# Patient Record
Sex: MALE | Race: WHITE | Employment: FULL TIME | URBAN - METROPOLITAN AREA
[De-identification: names, ages, dates, MRNs, and addresses within clinical notes are randomized per-mention and may not be internally consistent; named-entity substitution may affect disease eponyms.]

---

## 2017-05-04 ENCOUNTER — OFFICE VISIT (OUTPATIENT)
Dept: NEUROLOGY | Age: 42
End: 2017-05-04

## 2017-05-04 VITALS
SYSTOLIC BLOOD PRESSURE: 122 MMHG | HEART RATE: 75 BPM | WEIGHT: 265 LBS | BODY MASS INDEX: 37.1 KG/M2 | OXYGEN SATURATION: 98 % | DIASTOLIC BLOOD PRESSURE: 84 MMHG | HEIGHT: 71 IN

## 2017-05-04 DIAGNOSIS — G37.9 CLINICALLY ISOLATED SYNDROME (HCC): Primary | ICD-10-CM

## 2017-05-04 NOTE — MR AVS SNAPSHOT
Visit Information Date & Time Provider Department Dept. Phone Encounter #  
 5/4/2017  3:20 PM Mario Alberto Darby MD Neurology [facility] [place] [facility] [phone] [id number] Upcoming Health Maintenance Date Due INFLUENZA AGE 9 TO ADULT 8/1/2017 DTaP/Tdap/Td series (2 - Td) 12/16/2026 Allergies as of 5/4/2017  Review Complete On: 5/4/2017 By: Ronald Morfin LPN No Known Allergies Current Immunizations  Reviewed on 12/16/2016 Name Date Influenza Vaccine Guinevere Ashlyn) 10/21/2014 Influenza Vaccine (Quad) PF 11/22/2016 Tdap 12/16/2016 Not reviewed this visit You Were Diagnosed With   
  
 Codes Comments Clinically isolated syndrome (HCC)    -  Primary ICD-10-CM: G37.9 ICD-9-CM: 341. 9 Vitals BP Pulse Height(growth percentile) Weight(growth percentile) SpO2 BMI  
 122/84 (BP 1 Location: Left arm, BP Patient Position: Sitting) 75 5' 11\" (1.803 m) 265 lb (120.2 kg) 98% 36.96 kg/m2 Smoking Status Never Smoker BMI and BSA Data Body Mass Index Body Surface Area  
 36.96 kg/m 2 2.45 m 2 Preferred Pharmacy Pharmacy Name Phone [facility] [phone] [address] [phone] Your Updated Medication List  
  
   
This list is accurate as of: 5/4/17  3:47 PM.  Always use your most recent med list.  
  
  
  
  
 atorvastatin 40 mg tablet Commonly known as:  LIPITOR  
take 1 tablet by mouth once daily  
  
 ibuprofen 800 mg tablet Commonly known as:  MOTRIN Take 1 Tab by mouth every eight (8) hours as needed for Pain. interferon beta-1a 30 mcg/0.5 mL injection Commonly known as:  AVONEX Inject 30 mcg intramuscularly once a week as directed We Performed the Following CBC WITH AUTOMATED DIFF [98633 CPT(R)] METABOLIC PANEL, COMPREHENSIVE [13639 CPT(R)] To-Do List   
 05/05/2017 Imaging:  MRI BRAIN W WO CONT Introducing [facility]! Dear Natalie Savage: 
Thank you for requesting a [employer] account. Our records indicate that you already have an active [employer] account. You can access your account anytime at [email or web address] Did you know that you can access your hospital and ER discharge instructions at any time in [employer]? You can also review all of your test results from your hospital stay or ER visit. Additional Information If you have questions, please visit the Frequently Asked Questions section of the [employer] website at [email or web address] Remember, [employer] is NOT to be used for urgent needs. For medical emergencies, dial 911. Now available from your iPhone and Android! Please provide this summary of care documentation to your next provider. Your primary care clinician is listed as J Carlos Vale. If you have any questions after today's visit, please call [phone].

## 2017-05-04 NOTE — LETTER
Neurology Progress Note Patient ID: Jaspreet Frausto IV 
[id number] 
35 y.o. 
1975 HISTORY PROVIDED BY: 
Patient Chief Complaint: CIS Subjective:   
 
Mr. 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 He is on avonex. No side effects from this. He does have to stay well hydrated to help him. He takes it on Fridays. He takes tylenol before and after. He follows with ophthalmology yearly. He denies any focal numbness or weakness. He denies any further vision changes. He is doing well on Avonex. No major side effects. Recap: 
He has not had any new vision changes, as confirmed with his last opthomalogy appointment. He has no new numbness or weakness. His last MRI of cervical spine shows \"Normal cervical cord, Minimal multilevel degenerative disc disease,minimal degenerative disc bulging from C4 to C7. Foramina appear patent. \" MRI brain with stable white matter disease. He has been consistent with Avonex, and seems to be stable on this. He reports of dehydration with this medication, but supplements this increased water intake. He does not report any flu-like symptoms with this medication at this time. His last refill was in June, and therefore does not need another refill at this time. He does not take Copaxone as it was not complaint with his insurance. Otherwise, he is doing well with no other complaints at this time. Objective:  
ROS: 
Per HPI- 
Otherwise 12 point ROS was negative Meds: 
Current Outpatient Prescriptions on File Prior to Visit Medication Sig Dispense Refill  ibuprofen (MOTRIN) 800 mg tablet Take 1 Tab by mouth every eight (8) hours as needed for Pain.  30 Tab 0  
 atorvastatin (LIPITOR) 40 mg tablet take 1 tablet by mouth once daily 90 Tab 3  
 interferon beta-1a (AVONEX) 30 mcg/0.5 mL injection Inject 30 mcg intramuscularly once a week as directed 4 Kit 6  
 
 No current facility-administered medications on file prior to visit. Imaging: MRI brain in February 2016 was stable, with unremarkable findings. (I personally reviewed these images in PACS and this is my impression) MRI C spine: normal 
 
Reviewed records in [facility] and [facility] tab today- ophtho appt reviewed and pt had normal exam 
 
Lab Review Results for orders placed or performed in visit on 11/22/16 CBC WITH AUTOMATED DIFF Result Value Ref Range WBC 6.7 3.4 - 10.8 x10E3/uL  
 RBC 5.06 4.14 - 5.80 x10E6/uL HGB 15.7 12.6 - 17.7 g/dL HCT 45.7 37.5 - 51.0 % MCV 90 79 - 97 fL  
 MCH 31.0 26.6 - 33.0 pg  
 MCHC 34.4 31.5 - 35.7 g/dL  
 RDW 13.4 12.3 - 15.4 % PLATELET 683 004 - 585 x10E3/uL NEUTROPHILS 71 % Lymphocytes 21 % MONOCYTES 7 % EOSINOPHILS 1 % BASOPHILS 0 %  
 ABS. NEUTROPHILS 4.8 1.4 - 7.0 x10E3/uL Abs Lymphocytes 1.4 0.7 - 3.1 x10E3/uL  
 ABS. MONOCYTES 0.5 0.1 - 0.9 x10E3/uL  
 ABS. EOSINOPHILS 0.0 0.0 - 0.4 x10E3/uL  
 ABS. BASOPHILS 0.0 0.0 - 0.2 x10E3/uL IMMATURE GRANULOCYTES 0 %  
 ABS. IMM. GRANS. 0.0 0.0 - 0.1 x10E3/uL METABOLIC PANEL, COMPREHENSIVE Result Value Ref Range Glucose 76 65 - 99 mg/dL BUN 14 6 - 24 mg/dL Creatinine 0.78 0.76 - 1.27 mg/dL GFR est non- >59 mL/min/1.73 GFR est  >59 mL/min/1.73  
 BUN/Creatinine ratio 18 9 - 20 Sodium 142 136 - 144 mmol/L Potassium 4.5 3.5 - 5.2 mmol/L Chloride 99 97 - 106 mmol/L  
 CO2 27 18 - 29 mmol/L Calcium 9.3 8.7 - 10.2 mg/dL Protein, total 7.3 6.0 - 8.5 g/dL Albumin 4.5 3.5 - 5.5 g/dL GLOBULIN, TOTAL 2.8 1.5 - 4.5 g/dL A-G Ratio 1.6 1.1 - 2.5 Bilirubin, total 0.5 0.0 - 1.2 mg/dL Alk. phosphatase 67 39 - 117 IU/L  
 AST (SGOT) 31 0 - 40 IU/L  
 ALT (SGPT) 58 (H) 0 - 44 IU/L  
LIPID PANEL Result Value Ref Range Cholesterol, total 154 100 - 199 mg/dL Triglyceride 64 0 - 149 mg/dL HDL Cholesterol 50 >39 mg/dL VLDL, calculated 13 5 - 40 mg/dL LDL, calculated 91 0 - 99 mg/dL TSH 3RD GENERATION Result Value Ref Range TSH 1.660 0.450 - 4.500 uIU/mL CVD REPORT Result Value Ref Range INTERPRETATION Note Exam: 
Visit Vitals  /84 (BP 1 Location: Left arm, BP Patient Position: Sitting)  Pulse 75  Ht 5' 11\" (1.803 m)  Wt 120.2 kg (265 lb)  SpO2 98%  BMI 36.96 kg/m2 Gen: Well developed CV: RRR Lungs: non labored breathing Abd: non distending Neuro: A&O x 3, no dysarthria or aphasia CN II-XII: PERRL, EOMI, face symmetric, tongue/palate midline Motor: strength 5/5 all four ext Sensory: intact to LT Gait: normal 
 
Assessment:  
 
Kiran Mireles is  a 34 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 Currently he is on Avonex and doing well. He is staying hydrated and this helps him with symptoms of the medication. It is time to repeat imaging. Plan: 1. Continue on Avonex 2. Continue to pre-medicate side effects 3. Encouraged to stay physically active 4. Will do MRI of the brain today 5. Will do monitoring labs today 6. Cont with ophthalmology FU 6 months Felton Ramirez MD 
5/4/2017 
4:09 PM 
 
 
This note was created using voice recognition software. Despite editing, there may be syntax errors. This note will not be viewable in [address]

## 2017-05-04 NOTE — PROGRESS NOTES
Neurology Progress Note    Patient ID:  Nino Alonzo  [id number]  35 y.o.  1975    HISTORY PROVIDED BY:  Patient    Chief Complaint: CIS  Subjective:      Mr. 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 He is on avonex. No side effects from this. He does have to stay well hydrated to help him. He takes it on Fridays. He takes tylenol before and after. He follows with ophthalmology yearly. He denies any focal numbness or weakness. He denies any further vision changes. He is doing well on Avonex. No major side effects. Recap:  He has not had any new vision changes, as confirmed with his last opthomalogy appointment. He has no new numbness or weakness. His last MRI of cervical spine shows \"Normal cervical cord, Minimal multilevel degenerative disc disease,minimal degenerative disc bulging from C4 to C7. Foramina appear patent. \" MRI brain with stable white matter disease. He has been consistent with Avonex, and seems to be stable on this. He reports of dehydration with this medication, but supplements this increased water intake. He does not report any flu-like symptoms with this medication at this time. His last refill was in June, and therefore does not need another refill at this time. He does not take Copaxone as it was not complaint with his insurance. Otherwise, he is doing well with no other complaints at this time. Objective:   ROS:  Per HPI-  Otherwise 12 point ROS was negative    Meds:  Current Outpatient Prescriptions on File Prior to Visit   Medication Sig Dispense Refill    ibuprofen (MOTRIN) 800 mg tablet Take 1 Tab by mouth every eight (8) hours as needed for Pain.  30 Tab 0    atorvastatin (LIPITOR) 40 mg tablet take 1 tablet by mouth once daily 90 Tab 3    interferon beta-1a (AVONEX) 30 mcg/0.5 mL injection Inject 30 mcg intramuscularly once a week as directed 4 Kit 6     No current facility-administered medications on file prior to visit. Imaging:  MRI brain in February 2016 was stable, with unremarkable findings. (I personally reviewed these images in PACS and this is my impression)  MRI C spine: normal    Reviewed records in [facility] and media tab today- ophtho appt reviewed and pt had normal exam    Lab Review   Results for orders placed or performed in visit on 11/22/16   CBC WITH AUTOMATED DIFF   Result Value Ref Range    WBC 6.7 3.4 - 10.8 x10E3/uL    RBC 5.06 4.14 - 5.80 x10E6/uL    HGB 15.7 12.6 - 17.7 g/dL    HCT 45.7 37.5 - 51.0 %    MCV 90 79 - 97 fL    MCH 31.0 26.6 - 33.0 pg    MCHC 34.4 31.5 - 35.7 g/dL    RDW 13.4 12.3 - 15.4 %    PLATELET 019 894 - 881 x10E3/uL    NEUTROPHILS 71 %    Lymphocytes 21 %    MONOCYTES 7 %    EOSINOPHILS 1 %    BASOPHILS 0 %    ABS. NEUTROPHILS 4.8 1.4 - 7.0 x10E3/uL    Abs Lymphocytes 1.4 0.7 - 3.1 x10E3/uL    ABS. MONOCYTES 0.5 0.1 - 0.9 x10E3/uL    ABS. EOSINOPHILS 0.0 0.0 - 0.4 x10E3/uL    ABS. BASOPHILS 0.0 0.0 - 0.2 x10E3/uL    IMMATURE GRANULOCYTES 0 %    ABS. IMM. GRANS. 0.0 0.0 - 0.1 U77A2/CV   METABOLIC PANEL, COMPREHENSIVE   Result Value Ref Range    Glucose 76 65 - 99 mg/dL    BUN 14 6 - 24 mg/dL    Creatinine 0.78 0.76 - 1.27 mg/dL    GFR est non- >59 mL/min/1.73    GFR est  >59 mL/min/1.73    BUN/Creatinine ratio 18 9 - 20    Sodium 142 136 - 144 mmol/L    Potassium 4.5 3.5 - 5.2 mmol/L    Chloride 99 97 - 106 mmol/L    CO2 27 18 - 29 mmol/L    Calcium 9.3 8.7 - 10.2 mg/dL    Protein, total 7.3 6.0 - 8.5 g/dL    Albumin 4.5 3.5 - 5.5 g/dL    GLOBULIN, TOTAL 2.8 1.5 - 4.5 g/dL    A-G Ratio 1.6 1.1 - 2.5    Bilirubin, total 0.5 0.0 - 1.2 mg/dL    Alk.  phosphatase 67 39 - 117 IU/L    AST (SGOT) 31 0 - 40 IU/L    ALT (SGPT) 58 (H) 0 - 44 IU/L   LIPID PANEL   Result Value Ref Range    Cholesterol, total 154 100 - 199 mg/dL    Triglyceride 64 0 - 149 mg/dL    HDL Cholesterol 50 >39 mg/dL    VLDL, calculated 13 5 - 40 mg/dL    LDL, calculated 91 0 - 99 mg/dL   TSH 3RD GENERATION   Result Value Ref Range    TSH 1.660 0.450 - 4.500 uIU/mL   CVD REPORT   Result Value Ref Range    INTERPRETATION Note        Exam:  Visit Vitals    /84 (BP 1 Location: Left arm, BP Patient Position: Sitting)    Pulse 75    Ht 5' 11\" (1.803 m)    Wt 120.2 kg (265 lb)    SpO2 98%    BMI 36.96 kg/m2     Gen: Well developed  CV: RRR  Lungs: non labored breathing  Abd: non distending  Neuro: A&O x 3, no dysarthria or aphasia  CN II-XII: PERRL, EOMI, face symmetric, tongue/palate midline  Motor: strength 5/5 all four ext  Sensory: intact to LT  Gait: normal    Assessment:     Avinash Tineo is  a 36 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 Currently he is on Avonex and doing well. He is staying hydrated and this helps him with symptoms of the medication. It is time to repeat imaging. Plan:     1. Continue on Avonex  2. Continue to pre-medicate side effects   3. Encouraged to stay physically active   4. Will do MRI of the brain today  5. Will do monitoring labs today  6. Cont with ophthalmology      FU 6 months     Ralf Casrto MD  5/4/2017  4:09 PM      This note was created using voice recognition software. Despite editing, there may be syntax errors. This note will not be viewable in [address]

## 2017-05-16 ENCOUNTER — HOSPITAL ENCOUNTER (OUTPATIENT)
Dept: MRI IMAGING | Age: 42
Discharge: HOME | End: 2017-05-16
Attending: PSYCHIATRY & NEUROLOGY
Payer: COMMERCIAL

## 2017-05-16 DIAGNOSIS — G37.9 CLINICALLY ISOLATED SYNDROME (HCC): ICD-10-CM

## 2017-05-16 PROCEDURE — 70553 MRI BRAIN STEM W/O & W/DYE: CPT

## 2017-05-16 PROCEDURE — 74011250636 HC RX REV CODE- 250/636: Performed by: PSYCHIATRY & NEUROLOGY

## 2017-05-16 PROCEDURE — A9577 INJ MULTIHANCE: HCPCS | Performed by: PSYCHIATRY & NEUROLOGY

## 2017-05-16 RX ADMIN — GADOBENATE DIMEGLUMINE 20 ML: 529 INJECTION, SOLUTION INTRAVENOUS at 17:13

## 2017-11-20 RX ORDER — ATORVASTATIN CALCIUM 40 MG/1
TABLET, FILM COATED ORAL
Qty: 90 TAB | Refills: 2 | Status: SHIPPED | OUTPATIENT
Start: 2017-11-20 | End: 2018-12-07 | Stop reason: SDUPTHER

## 2017-11-21 ENCOUNTER — OFFICE VISIT (OUTPATIENT)
Dept: NEUROLOGY | Age: 42
End: 2017-11-21

## 2017-11-21 VITALS — HEART RATE: 86 BPM | DIASTOLIC BLOOD PRESSURE: 70 MMHG | SYSTOLIC BLOOD PRESSURE: 110 MMHG | OXYGEN SATURATION: 90 %

## 2017-11-21 DIAGNOSIS — H49.02 PARTIAL LEFT THIRD NERVE PALSY: ICD-10-CM

## 2017-11-21 DIAGNOSIS — G37.9 CLINICALLY ISOLATED SYNDROME (HCC): Primary | ICD-10-CM

## 2017-11-21 NOTE — PROGRESS NOTES
Neurology Progress Note    Patient ID:  Ian Garcia  [id number]  38 y.o.  1975    HISTORY PROVIDED BY:  Patient    Chief Complaint: CIS  Subjective:      Mr. 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 He is on avonex. Since last visit he did have updated imaging with MRI of the brain. It did show one new area of left frontal white matter change not previously seen. No enhancement. Patient denies any symptoms from this. Patient denies any focal numbness or weakness. He reports his vision is stable. He denies headaches. He reports he is doing well on his job with no complications. He takes Xanax on Fridays. His eyes this is well hydrated he has no issues with this. He did forget to get blood work at last visit we will get this today. Recap:  No side effects from this. He does have to stay well hydrated to help him. He takes it on Fridays. He takes tylenol before and after. He follows with ophthalmology yearly. He denies any focal numbness or weakness. He denies any further vision changes. He is doing well on Avonex. No major side effects. Objective:   ROS:  Per HPI-  Otherwise 12 point ROS was negative    Meds:  Current Outpatient Prescriptions on File Prior to Visit   Medication Sig Dispense Refill    atorvastatin (LIPITOR) 40 mg tablet take 1 tablet by mouth once daily 90 Tab 2    ibuprofen (MOTRIN) 800 mg tablet Take 1 Tab by mouth every eight (8) hours as needed for Pain. 30 Tab 0    interferon beta-1a (AVONEX) 30 mcg/0.5 mL injection Inject 30 mcg intramuscularly once a week as directed 4 Kit 6     No current facility-administered medications on file prior to visit.         Imaging:  MRI brain in May showed one new left frontal lesion with no enhancement otherwise unchanged (I personally reviewed these images in PACS and this is my impression)  MRI C spine: normal    Reviewed records in [facility] and [facility] tab today- ophtho appt reviewed and pt had normal exam    Lab Review   Results for orders placed or performed in visit on 11/22/16   CBC WITH AUTOMATED DIFF   Result Value Ref Range    WBC 6.7 3.4 - 10.8 x10E3/uL    RBC 5.06 4.14 - 5.80 x10E6/uL    HGB 15.7 12.6 - 17.7 g/dL    HCT 45.7 37.5 - 51.0 %    MCV 90 79 - 97 fL    MCH 31.0 26.6 - 33.0 pg    MCHC 34.4 31.5 - 35.7 g/dL    RDW 13.4 12.3 - 15.4 %    PLATELET 308 169 - 320 x10E3/uL    NEUTROPHILS 71 %    Lymphocytes 21 %    MONOCYTES 7 %    EOSINOPHILS 1 %    BASOPHILS 0 %    ABS. NEUTROPHILS 4.8 1.4 - 7.0 x10E3/uL    Abs Lymphocytes 1.4 0.7 - 3.1 x10E3/uL    ABS. MONOCYTES 0.5 0.1 - 0.9 x10E3/uL    ABS. EOSINOPHILS 0.0 0.0 - 0.4 x10E3/uL    ABS. BASOPHILS 0.0 0.0 - 0.2 x10E3/uL    IMMATURE GRANULOCYTES 0 %    ABS. IMM. GRANS. 0.0 0.0 - 0.1 [id number]/GAIL   METABOLIC PANEL, COMPREHENSIVE   Result Value Ref Range    Glucose 76 65 - 99 mg/dL    BUN 14 6 - 24 mg/dL    Creatinine 0.78 0.76 - 1.27 mg/dL    GFR est non- >59 mL/min/1.73    GFR est  >59 mL/min/1.73    BUN/Creatinine ratio 18 9 - 20    Sodium 142 136 - 144 mmol/L    Potassium 4.5 3.5 - 5.2 mmol/L    Chloride 99 97 - 106 mmol/L    CO2 27 18 - 29 mmol/L    Calcium 9.3 8.7 - 10.2 mg/dL    Protein, total 7.3 6.0 - 8.5 g/dL    Albumin 4.5 3.5 - 5.5 g/dL    GLOBULIN, TOTAL 2.8 1.5 - 4.5 g/dL    A-G Ratio 1.6 1.1 - 2.5    Bilirubin, total 0.5 0.0 - 1.2 mg/dL    Alk.  phosphatase 67 39 - 117 IU/L    AST (SGOT) 31 0 - 40 IU/L    ALT (SGPT) 58 (H) 0 - 44 IU/L   LIPID PANEL   Result Value Ref Range    Cholesterol, total 154 100 - 199 mg/dL    Triglyceride 64 0 - 149 mg/dL    HDL Cholesterol 50 >39 mg/dL    VLDL, calculated 13 5 - 40 mg/dL    LDL, calculated 91 0 - 99 mg/dL   TSH 3RD GENERATION   Result Value Ref Range    TSH 1.660 0.450 - 4.500 uIU/mL   CVD REPORT   Result Value Ref Range    INTERPRETATION Note        Exam:  Visit Vitals    /70    Pulse 86    SpO2 90% Gen: Well developed  CV: RRR  Lungs: non labored breathing  Abd: non distending  Neuro: A&O x 3, no dysarthria or aphasia  CN II-XII: PERRL, EOMI, face symmetric, tongue/palate midline  Motor: strength 5/5 all four ext  Sensory: intact to LT  Gait: normal    Assessment:     Leonid Adam is  a 37 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 Currently he is on Avonex and doing well. Will check monitoring labs today. We will repeat MRI prior to follow-up in May need to consider adjustment of medication of new lesions appear. Plan:     1. Continue on Avonex  2. Continue to pre-medicate side effects   3. Encouraged to stay physically active   4. Will do MRI of the brain 2 weeks prior to follow-up  5. Will do monitoring labs today  6. Cont with ophthalmology      FU 6 months     Payton Thompson MD  11/21/2017  4:09 PM      This note was created using voice recognition software. Despite editing, there may be syntax errors. This note will not be viewable in [address]

## 2017-11-21 NOTE — PATIENT INSTRUCTIONS
Learning About Living Martina  What is a living will? A living will is a legal form you use to write down the kind of care you want at the end of your life. It is used by the health professionals who will treat you if you aren't able to decide for yourself. If you put your wishes in writing, your loved ones and others will know what kind of care you want. They won't need to guess. This can ease your mind and be helpful to others. A living will is not the same as an estate or property will. An estate will explains what you want to happen with your money and property after you die. Is a living will a legal document? A living will is a legal document. Each state has its own laws about living [place]. If you move to another state, make sure that your living will is legal in the state where you now live. Or you might use a universal form that has been approved by many states. This kind of form can sometimes be completed and stored online. Your electronic copy will then be available wherever you have a connection to the Internet. In most cases, doctors will respect your wishes even if you have a form from a different state. · You don't need an  to complete a living will. But legal advice can be helpful if your state's laws are unclear, your health history is complicated, or your family can't agree on what should be in your living will. · You can change your living will at any time. Some people find that their wishes about end-of-life care change as their health changes. · In addition to making a living will, think about completing a medical power of  form. This form lets you name the person you want to make end-of-life treatment decisions for you (your \"health care agent\") if you're not able to. Many hospitals and nursing homes will give you the forms you need to complete a living will and a medical power of .   · Your living will is used only if you can't make or communicate decisions for yourself anymore. If you become able to make decisions again, you can accept or refuse any treatment, no matter what you wrote in your living will. · Your state may offer an online registry. This is a place where you can store your living will online so the doctors and nurses who need to treat you can find it right away. What should you think about when creating a living will? Talk about your end-of-life wishes with your family members and your doctor. Let them know what you want. That way the people making decisions for you won't be surprised by your choices. Think about these questions as you make your living will:  · Do you know enough about life support methods that might be used? If not, talk to your doctor so you know what might be done if you can't breathe on your own, your heart stops, or you're unable to swallow. · What things would you still want to be able to do after you receive life-support methods? Would you want to be able to walk? To speak? To eat on your own? To live without the help of machines? · If you have a choice, where do you want to be cared for? In your home? At a hospital or nursing home? · Do you want certain Pentecostalism practices performed if you become very ill? · If you have a choice at the end of your life, where would you prefer to die? At home? In a hospital or nursing home? Somewhere else? · Would you prefer to be buried or cremated? · Do you want your organs to be donated after you die? What should you do with your living will? · Make sure that your family members and your health care agent have copies of your living will. · Give your doctor a copy of your living will to keep in your medical record. If you have more than one doctor, make sure that each one has a copy. · You may want to put a copy of your living will where it can be easily found. Where can you learn more? Go to http://beverly-gume.info/.   Enter I095 in the search box to learn more about \"Learning About Living Martina. \"  Current as of: September 24, 2016  Content Version: 11.4  © 2754-3957 [employer]. Care instructions adapted under license by [employer] (which disclaims liability or warranty for this information). If you have questions about a medical condition or this instruction, always ask your healthcare professional. [address] any warranty or liability for your use of this information. Advance Directives: Care Instructions  Your Care Instructions  An advance directive is a legal way to state your wishes at the end of your life. It tells your family and your doctor what to do if you can no longer say what you want. There are two main types of advance directives. You can change them any time that your wishes change. · A living will tells your family and your doctor your wishes about life support and other treatment. · A durable power of  for health care lets you name a person to make treatment decisions for you when you can't speak for yourself. This person is called a health care agent. If you do not have an advance directive, decisions about your medical care may be made by a doctor or a  who doesn't know you. It may help to think of an advance directive as a gift to the people who care for you. If you have one, they won't have to make tough decisions by themselves. Follow-up care is a key part of your treatment and safety. Be sure to make and go to all appointments, and call your doctor if you are having problems. It's also a good idea to know your test results and keep a list of the medicines you take. How can you care for yourself at home? · Discuss your wishes with your loved ones and your doctor. This way, there are no surprises. · Many states have a unique form. Or you might use a universal form that has been approved by many states. This kind of form can sometimes be completed and stored online.  Your electronic copy will then be available wherever you have a connection to the Internet. In most cases, doctors will respect your wishes even if you have a form from a different state. · You don't need a  to do an advance directive. But you may want to get legal advice. · Think about these questions when you prepare an advance directive:  ¨ Who do you want to make decisions about your medical care if you are not able to? Many people choose a family member or close friend. ¨ Do you know enough about life support methods that might be used? If not, talk to your doctor so you understand. ¨ What are you most afraid of that might happen? You might be afraid of having pain, losing your independence, or being kept alive by machines. ¨ Where would you prefer to die? Choices include your home, a hospital, or a nursing home. ¨ Would you like to have information about hospice care to support you and your family? ¨ Do you want to donate organs when you die? ¨ Do you want certain Sikh practices performed before you die? If so, put your wishes in the advance directive. · Read your advance directive every year, and make changes as needed. When should you call for help? Be sure to contact your doctor if you have any questions. Where can you learn more? Go to http://beverly-gume.info/. Enter R264 in the search box to learn more about \"Advance Directives: Care Instructions. \"  Current as of: September 24, 2016  Content Version: 11.4  © 6106-1759 [employer]. Care instructions adapted under license by [employer] (which disclaims liability or warranty for this information). If you have questions about a medical condition or this instruction, always ask your healthcare professional. [place] [address] any warranty or liability for your use of this information.   10 Formerly named [facility]   Statement to Patients  April 1, 2014      In an effort to ensure the large volume of patient prescription refills is processed in the most efficient and expeditious manner, we are asking our patients to assist us by calling your Pharmacy for all prescription refills, this will include also your  Mail Order Pharmacy. The pharmacy will contact our office electronically to continue the refill process. Please do not wait until the last minute to call your pharmacy. We need at least 48 hours (2days) to fill prescriptions. We also encourage you to call your pharmacy before going to  your prescription to make sure it is ready. With regard to controlled substance prescription refill requests (narcotic refills) that need to be picked up at our office, we ask your cooperation by providing us with at least 72 hours (3days) notice that you will need a refill. We will not refill narcotic prescription refill requests after 4:00pm on any weekday, Monday through Thursday, or after 2:00pm on Fridays, or on the weekends. We encourage everyone to explore another way of getting your prescription refill request processed using [employer], our patient web portal through our electronic medical record system. [employer] is an efficient and effective way to communicate your medication request directly to the office and  downloadable as an meron on your smart phone . [employer] also features a review functionality that allows you to view your medication list as well as leave messages for your physician. Are you ready to get connected? If so please review the attatched instructions or speak to any of our staff to get you set up right away! Thank you so much for your cooperation. Should you have any questions please contact our Practice Administrator. The Physicians and Staff,  [facility]     If we have ordered testing for you, we typically do not call patients with results.  Your doctor or nurse will contact you if there are critical results that need to be addressed before your next appointment. We also schedule follow up appointments so that your results can be discussed in person and any questions you have regarding them may be addressed. Additionally, results may be found by using the My Chart feature and one of our patient service representatives at the  can give you instructions on how to access this feature of our electronic medical record system.

## 2017-11-21 NOTE — LETTER
Neurology Progress Note Patient ID: Leona April IV 
[id number] 
38 y.o. 
1975 HISTORY PROVIDED BY: 
Patient Chief Complaint: CIS Subjective:   
 
Mr. 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 He is on avonex. Since last visit he did have updated imaging with MRI of the brain. It did show one new area of left frontal white matter change not previously seen. No enhancement. Patient denies any symptoms from this. Patient denies any focal numbness or weakness. He reports his vision is stable. He denies headaches. He reports he is doing well on his job with no complications. He takes Xanax on Fridays. His eyes this is well hydrated he has no issues with this. He did forget to get blood work at last visit we will get this today. Recap: 
No side effects from this. He does have to stay well hydrated to help him. He takes it on Fridays. He takes tylenol before and after. He follows with ophthalmology yearly. He denies any focal numbness or weakness. He denies any further vision changes. He is doing well on Avonex. No major side effects. Objective:  
ROS: 
Per HPI- 
Otherwise 12 point ROS was negative Meds: 
Current Outpatient Prescriptions on File Prior to Visit Medication Sig Dispense Refill  atorvastatin (LIPITOR) 40 mg tablet take 1 tablet by mouth once daily 90 Tab 2  ibuprofen (MOTRIN) 800 mg tablet Take 1 Tab by mouth every eight (8) hours as needed for Pain. 30 Tab 0  
 interferon beta-1a (AVONEX) 30 mcg/0.5 mL injection Inject 30 mcg intramuscularly once a week as directed 4 Kit 6 No current facility-administered medications on file prior to visit. Imaging: MRI brain in May showed one new left frontal lesion with no enhancement otherwise unchanged (I personally reviewed these images in PACS and this is my impression) MRI C spine: normal 
 
 [facility] [employer] [facility] opho appt reviewed and pt had normal exam 
 
Lab Review Results for orders placed or performed in visit on 11/22/16 CBC WITH AUTOMATED DIFF Result Value Ref Range WBC 6.7 3.4 - 10.8 x10E3/uL  
 RBC 5.06 4.14 - 5.80 x10E6/uL HGB 15.7 12.6 - 17.7 g/dL HCT 45.7 37.5 - 51.0 % MCV 90 79 - 97 fL  
 MCH 31.0 26.6 - 33.0 pg  
 MCHC 34.4 31.5 - 35.7 g/dL  
 RDW 13.4 12.3 - 15.4 % PLATELET 481 744 - 298 x10E3/uL NEUTROPHILS 71 % Lymphocytes 21 % MONOCYTES 7 % EOSINOPHILS 1 % BASOPHILS 0 %  
 ABS. NEUTROPHILS 4.8 1.4 - 7.0 x10E3/uL Abs Lymphocytes 1.4 0.7 - 3.1 x10E3/uL  
 ABS. MONOCYTES 0.5 0.1 - 0.9 x10E3/uL  
 ABS. EOSINOPHILS 0.0 0.0 - 0.4 x10E3/uL  
 ABS. BASOPHILS 0.0 0.0 - 0.2 x10E3/uL IMMATURE GRANULOCYTES 0 %  
 ABS. IMM. GRANS. 0.0 0.0 - 0.1 x10E3/uL METABOLIC PANEL, COMPREHENSIVE Result Value Ref Range Glucose 76 65 - 99 mg/dL BUN 14 6 - 24 mg/dL Creatinine 0.78 0.76 - 1.27 mg/dL GFR est non- >59 mL/min/1.73 GFR est  >59 mL/min/1.73  
 BUN/Creatinine ratio 18 9 - 20 Sodium 142 136 - 144 mmol/L Potassium 4.5 3.5 - 5.2 mmol/L Chloride 99 97 - 106 mmol/L  
 CO2 27 18 - 29 mmol/L Calcium 9.3 8.7 - 10.2 mg/dL Protein, total 7.3 6.0 - 8.5 g/dL Albumin 4.5 3.5 - 5.5 g/dL GLOBULIN, TOTAL 2.8 1.5 - 4.5 g/dL A-G Ratio 1.6 1.1 - 2.5 Bilirubin, total 0.5 0.0 - 1.2 mg/dL Alk. phosphatase 67 39 - 117 IU/L  
 AST (SGOT) 31 0 - 40 IU/L  
 ALT (SGPT) 58 (H) 0 - 44 IU/L  
LIPID PANEL Result Value Ref Range Cholesterol, total 154 100 - 199 mg/dL Triglyceride 64 0 - 149 mg/dL HDL Cholesterol 50 >39 mg/dL VLDL, calculated 13 5 - 40 mg/dL LDL, calculated 91 0 - 99 mg/dL TSH 3RD GENERATION Result Value Ref Range TSH 1.660 0.450 - 4.500 uIU/mL CVD REPORT Result Value Ref Range INTERPRETATION Note Exam: 
Visit Vitals  /70  Pulse 86  
  SpO2 90% Gen: Well developed CV: RRR Lungs: non labored breathing Abd: non distending Neuro: A&O x 3, no dysarthria or aphasia CN II-XII: PERRL, EOMI, face symmetric, tongue/palate midline Motor: strength 5/5 all four ext Sensory: intact to LT Gait: normal 
 
Assessment:  
 
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 Currently he is on Avonex and doing well. Will check monitoring labs today. We will repeat MRI prior to follow-up in May need to consider adjustment of medication of new lesions appear. Plan: 1. Continue on Avonex 2. Continue to pre-medicate side effects 3. Encouraged to stay physically active 4. Will do MRI of the brain 2 weeks prior to follow-up 5. Will do monitoring labs today 6. Cont with ophthalmology FU 6 months Stevan Sawyer MD 
11/21/2017 
4:09 PM 
 
 
This note was created using voice recognition software. Despite editing, there may be syntax errors. This note will not be viewable in [address]

## 2017-11-21 NOTE — MR AVS SNAPSHOT
Visit Information Date & Time Provider Department Dept. Phone Encounter #  
 11/21/2017  1:40 PM [facility] [phone] [id number] Upcoming Health Maintenance Date Due Influenza Age 5 to Adult 8/1/2017 DTaP/Tdap/Td series (2 - Td) 12/16/2026 Allergies as of 11/21/2017  Review Complete On: 11/21/2017 By: Charles Greer No Known Allergies Current Immunizations  Reviewed on 12/16/2016 Name Date Influenza Vaccine Veronia Consuelo) 10/21/2014 Influenza Vaccine (Quad) PF 11/22/2016 Tdap 12/16/2016 Not reviewed this visit You Were Diagnosed With   
  
 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 
ICD-9-CM: 378.51 Clinically isolated syndrome (HCC)     ICD-10-CM: G37.9 ICD-9-CM: 341. 9 Vitals BP Pulse SpO2 Smoking Status 110/70 86 90% Never Smoker Preferred Pharmacy Pharmacy Name Phone Vicente Alonso, [address] [facility] [phone] Your Updated Medication List  
  
   
This list is accurate as of: 11/21/17  2:07 PM.  Always use your most recent med list.  
  
  
  
  
 atorvastatin 40 mg tablet Commonly known as:  LIPITOR  
take 1 tablet by mouth once daily  
  
 ibuprofen 800 mg tablet Commonly known as:  MOTRIN Take 1 Tab by mouth every eight (8) hours as needed for Pain. interferon beta-1a 30 mcg/0.5 mL injection Commonly known as:  AVONEX Inject 30 mcg intramuscularly once a week as directed We Performed the Following CBC WITH AUTOMATED DIFF [69645 CPT(R)] METABOLIC PANEL, COMPREHENSIVE [69827 CPT(R)] Patient Instructions Amanda Waters 1721 What is a living will? A living will is a legal form you use to write down the kind of care you want at the end of your life.  It is used by the health professionals who will treat you if you aren't able to decide for yourself. If you put your wishes in writing, your loved ones and others will know what kind of care you want. They won't need to guess. This can ease your mind and be helpful to others. A living will is not the same as an estate or property will. An estate will explains what you want to happen with your money and property after you die. Is a living will a legal document? A living will is a legal document. Each state has its own laws about living [place]. If you move to another state, make sure that your living will is legal in the state where you now live. Or you might use a universal form that has been approved by many states. This kind of form can sometimes be completed and stored online. Your electronic copy will then be available wherever you have a connection to the Internet. In most cases, doctors will respect your wishes even if you have a form from a different state. · You don't need an  to complete a living will. But legal advice can be helpful if your state's laws are unclear, your health history is complicated, or your family can't agree on what should be in your living will. · You can change your living will at any time. Some people find that their wishes about end-of-life care change as their health changes. · In addition to making a living will, think about completing a medical power of  form. This form lets you name the person you want to make end-of-life treatment decisions for you (your \"health care agent\") if you're not able to. Many hospitals and nursing homes will give you the forms you need to complete a living will and a medical power of . · Your living will is used only if you can't make or communicate decisions for yourself anymore. If you become able to make decisions again, you can accept or refuse any treatment, no matter what you wrote in your living will. · Your state may offer an online registry. This is a place where you can store your living will online so the doctors and nurses who need to treat you can find it right away. What should you think about when creating a living will? Talk about your end-of-life wishes with your family members and your doctor. Let them know what you want. That way the people making decisions for you won't be surprised by your choices. Think about these questions as you make your living will: · Do you know enough about life support methods that might be used? If not, talk to your doctor so you know what might be done if you can't breathe on your own, your heart stops, or you're unable to swallow. · What things would you still want to be able to do after you receive life-support methods? Would you want to be able to walk? To speak? To eat on your own? To live without the help of machines? · If you have a choice, where do you want to be cared for? In your home? At a hospital or nursing home? · Do you want certain Caodaism practices performed if you become very ill? · If you have a choice at the end of your life, where would you prefer to die? At home? In a hospital or nursing home? Somewhere else? · Would you prefer to be buried or cremated? · Do you want your organs to be donated after you die? What should you do with your living will? · Make sure that your family members and your health care agent have copies of your living will. · Give your doctor a copy of your living will to keep in your medical record. If you have more than one doctor, make sure that each one has a copy. · You may want to put a copy of your living will where it can be easily found. Where can you learn more? Go to http://beverly-gume.info/. Enter F254 in the search box to learn more about \"Learning About Living [place] Person. \" Current as of: September 24, 2016 Content Version: 11.4 © 8236-5934 Healthwise, [email or web address]. Care instructions adapted under license by [employer] (which disclaims liability or warranty for this information). If you have questions about a medical condition or this instruction, always ask your healthcare professional. [address] any warranty or liability for your use of this information. Advance Directives: Care Instructions Your Care Instructions An advance directive is a legal way to state your wishes at the end of your life. It tells your family and your doctor what to do if you can no longer say what you want. There are two main types of advance directives. You can change them any time that your wishes change. · A living will tells your family and your doctor your wishes about life support and other treatment. · A durable power of  for health care lets you name a person to make treatment decisions for you when you can't speak for yourself. This person is called a health care agent. If you do not have an advance directive, decisions about your medical care may be made by a doctor or a  who doesn't know you. It may help to think of an advance directive as a gift to the people who care for you. If you have one, they won't have to make tough decisions by themselves. Follow-up care is a key part of your treatment and safety. Be sure to make and go to all appointments, and call your doctor if you are having problems. It's also a good idea to know your test results and keep a list of the medicines you take. How can you care for yourself at home? · Discuss your wishes with your loved ones and your doctor. This way, there are no surprises. · Many states have a unique form. Or you might use a universal form that has been approved by many states. This kind of form can sometimes be completed and stored online.  Your electronic copy will then be available wherever you have a connection to the Internet. In most cases, doctors will respect your wishes even if you have a form from a different state. · You don't need a  to do an advance directive. But you may want to get legal advice. · Think about these questions when you prepare an advance directive: ¨ Who do you want to make decisions about your medical care if you are not able to? Many people choose a family member or close friend. ¨ Do you know enough about life support methods that might be used? If not, talk to your doctor so you understand. ¨ What are you most afraid of that might happen? You might be afraid of having pain, losing your independence, or being kept alive by machines. ¨ Where would you prefer to die? Choices include your home, a hospital, or a nursing home. ¨ Would you like to have information about hospice care to support you and your family? ¨ Do you want to donate organs when you die? ¨ Do you want certain Buddhism practices performed before you die? If so, put your wishes in the advance directive. · Read your advance directive every year, and make changes as needed. When should you call for help? Be sure to contact your doctor if you have any questions. Where can you learn more? Go to http://beverly-gume.info/. Enter R264 in the search box to learn more about \"Advance Directives: Care Instructions. \" Current as of: September 24, 2016 Content Version: 11.4 © 9171-6208 [employer]. Care instructions adapted under license by [employer] (which disclaims liability or warranty for this information). If you have questions about a medical condition or this instruction, always ask your healthcare professional. [place] [address] any warranty or liability for your use of this information. PRESCRIPTION REFILL POLICY [facility] Statement to Patients April 1, 2014 In an effort to ensure the large volume of patient prescription refills is processed in the most efficient and expeditious manner, we are asking our patients to assist us by calling your Pharmacy for all prescription refills, this will include also your  Mail Order Pharmacy. The pharmacy will contact our office electronically to continue the refill process. Please do not wait until the last minute to call your pharmacy. We need at least 48 hours (2days) to fill prescriptions. We also encourage you to call your pharmacy before going to  your prescription to make sure it is ready. With regard to controlled substance prescription refill requests (narcotic refills) that need to be picked up at our office, we ask your cooperation by providing us with at least 72 hours (3days) notice that you will need a refill. We will not refill narcotic prescription refill requests after 4:00pm on any weekday, Monday through Thursday, or after 2:00pm on Fridays, or on the weekends. We encourage everyone to explore another way of getting your prescription refill request processed using [employer], our patient web portal through our electronic medical record system. [employer] is an efficient and effective way to communicate your medication request directly to the office and  downloadable as an meron on your smart phone . [employer] also features a review functionality that allows you to view your medication list as well as leave messages for your physician. Are you ready to get connected? If so please review the attatched instructions or speak to any of our staff to get you set up right away! Thank you so much for your cooperation. Should you have any questions please contact our Practice Administrator. The Physicians and Staff,  [facility] If we have ordered testing for you, we typically do not call patients with results.  Your doctor or nurse will contact you if there are critical results that need to be addressed before your next appointment. We also schedule follow up appointments so that your results can be discussed in person and any questions you have regarding them may be addressed. Additionally, results may be found by using the My Chart feature and one of our patient service representatives at the  can give you instructions on how to access this feature of our electronic medical record system. Introducing [facility] & [facility] SERVICES! Dear Inge Gibbs: 
Thank you for requesting a [employer] account. Our records indicate that you already have an active [employer] account. You can access your account anytime at [email or web address]/[email or web address]. Did you know that you can access your hospital and ER discharge instructions at any time in [employer]? You can also review all of your test results from your hospital stay or ER visit. Additional Information If you have questions, please visit the Frequently Asked Questions section of the [employer] website at [email or web address]. Remember, [employer] is NOT to be used for urgent needs. For medical emergencies, dial 911. Now available from your iPhone and Android! Please provide this summary of care documentation to your next provider. Your primary care clinician is listed as Juarez Wei. If you have any questions after today's visit, please call [phone].

## 2017-11-22 LAB
ALBUMIN SERPL-MCNC: 4.1 G/DL (ref 3.5–5.5)
ALBUMIN/GLOB SERPL: 1.5 {RATIO} (ref 1.2–2.2)
ALP SERPL-CCNC: 67 IU/L (ref 39–117)
ALT SERPL-CCNC: 37 IU/L (ref 0–44)
AST SERPL-CCNC: 26 IU/L (ref 0–40)
BASOPHILS # BLD AUTO: 0 X10E3/UL (ref 0–0.2)
BASOPHILS NFR BLD AUTO: 0 %
BILIRUB SERPL-MCNC: 0.3 MG/DL (ref 0–1.2)
BUN SERPL-MCNC: 11 MG/DL (ref 6–24)
BUN/CREAT SERPL: 17 (ref 9–20)
CALCIUM SERPL-MCNC: 9.3 MG/DL (ref 8.7–10.2)
CHLORIDE SERPL-SCNC: 100 MMOL/L (ref 96–106)
CO2 SERPL-SCNC: 29 MMOL/L (ref 18–29)
CREAT SERPL-MCNC: 0.66 MG/DL (ref 0.76–1.27)
EOSINOPHIL # BLD AUTO: 0.1 X10E3/UL (ref 0–0.4)
EOSINOPHIL NFR BLD AUTO: 1 %
ERYTHROCYTE [DISTWIDTH] IN BLOOD BY AUTOMATED COUNT: 13.1 % (ref 12.3–15.4)
GFR SERPLBLD CREATININE-BSD FMLA CKD-EPI: 118 ML/MIN/1.73
GFR SERPLBLD CREATININE-BSD FMLA CKD-EPI: 136 ML/MIN/1.73
GLOBULIN SER CALC-MCNC: 2.7 G/DL (ref 1.5–4.5)
GLUCOSE SERPL-MCNC: 80 MG/DL (ref 65–99)
HCT VFR BLD AUTO: 43 % (ref 37.5–51)
HGB BLD-MCNC: 14.9 G/DL (ref 12.6–17.7)
IMM GRANULOCYTES # BLD: 0 X10E3/UL (ref 0–0.1)
IMM GRANULOCYTES NFR BLD: 1 %
LYMPHOCYTES # BLD AUTO: 1.4 X10E3/UL (ref 0.7–3.1)
LYMPHOCYTES NFR BLD AUTO: 22 %
MCH RBC QN AUTO: 30.5 PG (ref 26.6–33)
MCHC RBC AUTO-ENTMCNC: 34.7 G/DL (ref 31.5–35.7)
MCV RBC AUTO: 88 FL (ref 79–97)
MONOCYTES # BLD AUTO: 0.5 X10E3/UL (ref 0.1–0.9)
MONOCYTES NFR BLD AUTO: 7 %
NEUTROPHILS # BLD AUTO: 4.5 X10E3/UL (ref 1.4–7)
NEUTROPHILS NFR BLD AUTO: 69 %
PLATELET # BLD AUTO: 196 X10E3/UL (ref 150–379)
POTASSIUM SERPL-SCNC: 4.4 MMOL/L (ref 3.5–5.2)
PROT SERPL-MCNC: 6.8 G/DL (ref 6–8.5)
RBC # BLD AUTO: 4.88 X10E6/UL (ref 4.14–5.8)
SODIUM SERPL-SCNC: 143 MMOL/L (ref 134–144)
WBC # BLD AUTO: 6.5 X10E3/UL (ref 3.4–10.8)

## 2018-04-20 ENCOUNTER — TELEPHONE (OUTPATIENT)
Dept: NEUROLOGY | Age: 43
End: 2018-04-20

## 2018-04-20 NOTE — TELEPHONE ENCOUNTER
----- Message from Namita Cardona sent at 4/20/2018  9:17 AM EDT -----  Regarding: Dr. Arlet Jolly  Pt stated he would like a call from the nurse regarding his medication(\"Avonex\"). Best contact number [phone].

## 2018-04-20 NOTE — TELEPHONE ENCOUNTER
Spoke with patient regarding his avonex. He stated that he spoke with [employer] just and that everything was resolved and he will be getting his injection pen tomorrow.

## 2018-04-20 NOTE — TELEPHONE ENCOUNTER
----- Message from Becki Benz sent at 4/20/2018  3:59 PM EDT -----  Regarding: Dr Lionel Beltran  Pt stated he would like a call from the nurse regarding his medication(\"Avonex\").   Best contact number [phone].

## 2018-05-22 ENCOUNTER — OFFICE VISIT (OUTPATIENT)
Dept: NEUROLOGY | Age: 43
End: 2018-05-22

## 2018-05-22 VITALS
HEIGHT: 71 IN | WEIGHT: 265 LBS | HEART RATE: 69 BPM | RESPIRATION RATE: 18 BRPM | DIASTOLIC BLOOD PRESSURE: 72 MMHG | SYSTOLIC BLOOD PRESSURE: 124 MMHG | OXYGEN SATURATION: 97 % | BODY MASS INDEX: 37.1 KG/M2

## 2018-05-22 DIAGNOSIS — G37.9 CLINICALLY ISOLATED SYNDROME (HCC): Primary | ICD-10-CM

## 2018-05-22 NOTE — PROGRESS NOTES
Neurology Progress Note    Patient ID:  Gaby Flores  [id number]  38 y.o.  1975    HISTORY PROVIDED BY:  Patient    Chief Complaint: CIS  Subjective:      Mr. 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 He was diagnoesd with CIS. He is doing well on the Avonex. He takes it every Friday. If he doesn't hydrate he will feel kind of off. He is working with no issues. He denies any flares. He did have an MRI of the brain that showed a new lesion last May, but he did not have any symptoms from this, and did not want to change therapy at that time. He is on the free drug program with Avonex so would prefer to be on it possible. He denies any fatigue. He denies any memory changes. Overall he reports feeling very well. Recap:  He is on avonex. Since last visit he did have updated imaging with MRI of the brain. It did show one new area of left frontal white matter change not previously seen. No enhancement. Patient denies any symptoms from this. Patient denies any focal numbness or weakness. He reports his vision is stable. He denies headaches. He reports he is doing well on his job with no complications. He takes Xanax on Fridays. His eyes this is well hydrated he has no issues with this. He did forget to get blood work at last visit we will get this today. Objective:   ROS:  Per HPI-  Otherwise 12 point ROS was negative    Meds:  Current Outpatient Prescriptions on File Prior to Visit   Medication Sig Dispense Refill    atorvastatin (LIPITOR) 40 mg tablet take 1 tablet by mouth once daily 90 Tab 2    ibuprofen (MOTRIN) 800 mg tablet Take 1 Tab by mouth every eight (8) hours as needed for Pain. 30 Tab 0    interferon beta-1a (AVONEX) 30 mcg/0.5 mL injection Inject 30 mcg intramuscularly once a week as directed 4 Kit 6     No current facility-administered medications on file prior to visit.         Imaging:  MRI brain in May showed one new left frontal lesion with no enhancement otherwise unchanged (I personally reviewed these images in PACS and this is my impression)  MRI C spine: normal    Reviewed records in [employer] and media tab today- ophtho appt reviewed and pt had normal exam    Lab Review   Results for orders placed or performed in visit on 11/21/17   CBC WITH AUTOMATED DIFF   Result Value Ref Range    WBC 6.5 3.4 - 10.8 x10E3/uL    RBC 4.88 4.14 - 5.80 x10E6/uL    HGB 14.9 12.6 - 17.7 g/dL    HCT 43.0 37.5 - 51.0 %    MCV 88 79 - 97 fL    MCH 30.5 26.6 - 33.0 pg    MCHC 34.7 31.5 - 35.7 g/dL    RDW 13.1 12.3 - 15.4 %    PLATELET 905 069 - 989 x10E3/uL    NEUTROPHILS 69 Not Estab. %    Lymphocytes 22 Not Estab. %    MONOCYTES 7 Not Estab. %    EOSINOPHILS 1 Not Estab. %    BASOPHILS 0 Not Estab. %    ABS. NEUTROPHILS 4.5 1.4 - 7.0 x10E3/uL    Abs Lymphocytes 1.4 0.7 - 3.1 x10E3/uL    ABS. MONOCYTES 0.5 0.1 - 0.9 x10E3/uL    ABS. EOSINOPHILS 0.1 0.0 - 0.4 x10E3/uL    ABS. BASOPHILS 0.0 0.0 - 0.2 x10E3/uL    IMMATURE GRANULOCYTES 1 Not Estab. %    ABS. IMM. GRANS. 0.0 0.0 - 0.1 [id number]/XO   METABOLIC PANEL, COMPREHENSIVE   Result Value Ref Range    Glucose 80 65 - 99 mg/dL    BUN 11 6 - 24 mg/dL    Creatinine 0.66 (L) 0.76 - 1.27 mg/dL    GFR est non- >59 mL/min/1.73    GFR est  >59 mL/min/1.73    BUN/Creatinine ratio 17 9 - 20    Sodium 143 134 - 144 mmol/L    Potassium 4.4 3.5 - 5.2 mmol/L    Chloride 100 96 - 106 mmol/L    CO2 29 18 - 29 mmol/L    Calcium 9.3 8.7 - 10.2 mg/dL    Protein, total 6.8 6.0 - 8.5 g/dL    Albumin 4.1 3.5 - 5.5 g/dL    GLOBULIN, TOTAL 2.7 1.5 - 4.5 g/dL    A-G Ratio 1.5 1.2 - 2.2    Bilirubin, total 0.3 0.0 - 1.2 mg/dL    Alk. phosphatase 67 39 - 117 IU/L    AST (SGOT) 26 0 - 40 IU/L    ALT (SGPT) 37 0 - 44 IU/L       Exam:  Visit Vitals    /72    Pulse 69    Resp 18    Ht 5' 11\" (1.803 m)    Wt 120.2 kg (265 lb)    SpO2 97%    BMI 36.96 kg/m2     Gen:  Well developed  CV: RRR  Lungs: non labored breathing  Abd: non distending  Neuro: A&O x 3, no dysarthria or aphasia  CN II-XII: PERRL, EOMI, face symmetric, tongue/palate midline  Motor: strength 5/5 all four ext  Sensory: intact to LT  Gait: normal    Assessment:     Kate Campos is  a 37 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 Currently he is on Avonex and doing well. Monitoring labs were stable. Will recheck MRI of the brain and it has additional lesions, will discuss changing therapy. Plan:     1. Continue on Avonex. Patient is on a free drug program  2. Continue to pre-medicate side effects   3. Encouraged to stay physically active   4. Plan for MRI of the brain with and without contrast  5. Monitoring labs stable  6. Cont with ophthalmology      FU 6 months     Keyona Starks MD  5/22/2018  4:09 PM      This note was created using voice recognition software. Despite editing, there may be syntax errors. This note will not be viewable in [address]

## 2018-05-22 NOTE — MR AVS SNAPSHOT
[address] [place] [address] [address] [facility] [place] [address] [phone] Patient: Loni Hu IV MRN: [id number] TALIA:[id number]/[id number] Visit Information Date & Time Provider Department Dept. Phone Encounter #  
 5/22/2018  1:40 PM Elvira Segal MD [facility] Neurology [facility] [phone] [id number] Follow-up Instructions Return in about 6 months (around 11/22/2018). Upcoming Health Maintenance Date Due Influenza Age 5 to Adult 8/1/2018 DTaP/Tdap/Td series (2 - Td) 12/16/2026 Allergies as of 5/22/2018  Review Complete On: 11/21/2017 By: Elvira Segal MD  
 No Known Allergies Current Immunizations  Reviewed on 12/16/2016 Name Date Influenza Vaccine Caesar Jeseniay) 10/21/2014 Influenza Vaccine (Quad) PF 11/22/2016 Tdap 12/16/2016 Not reviewed this visit You Were Diagnosed With   
  
 Codes Comments Clinically isolated syndrome (HCC)    -  Primary ICD-10-CM: G37.9 ICD-9-CM: 341. 9 Vitals BP Pulse Resp Height(growth percentile) Weight(growth percentile) SpO2  
 124/72 69 18 5' 11\" (1.803 m) 265 lb (120.2 kg) 97% BMI Smoking Status 36.96 kg/m2 Never Smoker BMI and BSA Data Body Mass Index Body Surface Area  
 36.96 kg/m 2 2.45 m 2 Preferred Pharmacy Pharmacy Name Phone [facility], [address] [facility] [phone] Your Updated Medication List  
  
   
This list is accurate as of 5/22/18  1:56 PM.  Always use your most recent med list.  
  
  
  
  
 atorvastatin 40 mg tablet Commonly known as:  LIPITOR  
take 1 tablet by mouth once daily  
  
 ibuprofen 800 mg tablet Commonly known as:  MOTRIN Take 1 Tab by mouth every eight (8) hours as needed for Pain. interferon beta-1a 30 mcg/0.5 mL injection Commonly known as:  AVONEX Inject 30 mcg intramuscularly once a week as directed Follow-up Instructions Return in about 6 months (around 11/22/2018). To-Do List   
 05/23/2018 Imaging:  MRI BRAIN W WO CONT Patient Instructions Amanda Waters 1721 What is a living will? A living will is a legal form you use to write down the kind of care you want at the end of your life. It is used by the health professionals who will treat you if you aren't able to decide for yourself. If you put your wishes in writing, your loved ones and others will know what kind of care you want. They won't need to guess. This can ease your mind and be helpful to others. A living will is not the same as an estate or property will. An estate will explains what you want to happen with your money and property after you die. Is a living will a legal document? A living will is a legal document. Each state has its own laws about living [place]. If you move to another state, make sure that your living will is legal in the state where you now live. Or you might use a universal form that has been approved by many states. This kind of form can sometimes be completed and stored online. Your electronic copy will then be available wherever you have a connection to the Internet. In most cases, doctors will respect your wishes even if you have a form from a different state. · You don't need an  to complete a living will. But legal advice can be helpful if your state's laws are unclear, your health history is complicated, or your family can't agree on what should be in your living will. · You can change your living will at any time. Some people find that their wishes about end-of-life care change as their health changes. · In addition to making a living will, think about completing a medical power of  form.  This form lets you name the person you want to make end-of-life treatment decisions for you (your \"health care agent\") if you're not able to. Many hospitals and nursing homes will give you the forms you need to complete a living will and a medical power of . · Your living will is used only if you can't make or communicate decisions for yourself anymore. If you become able to make decisions again, you can accept or refuse any treatment, no matter what you wrote in your living will. · Your state may offer an online registry. This is a place where you can store your living will online so the doctors and nurses who need to treat you can find it right away. What should you think about when creating a living will? Talk about your end-of-life wishes with your family members and your doctor. Let them know what you want. That way the people making decisions for you won't be surprised by your choices. Think about these questions as you make your living will: · Do you know enough about life support methods that might be used? If not, talk to your doctor so you know what might be done if you can't breathe on your own, your heart stops, or you're unable to swallow. · What things would you still want to be able to do after you receive life-support methods? Would you want to be able to walk? To speak? To eat on your own? To live without the help of machines? · If you have a choice, where do you want to be cared for? In your home? At a hospital or nursing home? · Do you want certain Gnosticist practices performed if you become very ill? · If you have a choice at the end of your life, where would you prefer to die? At home? In a hospital or nursing home? Somewhere else? · Would you prefer to be buried or cremated? · Do you want your organs to be donated after you die? What should you do with your living will? · Make sure that your family members and your health care agent have copies of your living will.  
· Give your doctor a copy of your living will to keep in your medical record. If you have more than one doctor, make sure that each one has a copy. · You may want to put a copy of your living will where it can be easily found. Where can you learn more? Go to http://beverly-gume.info/. Enter O555 in the search box to learn more about \"Learning About Living Martina. \" Current as of: September 24, 2016 Content Version: 11.4 © 4433-9493 [employer]. Care instructions adapted under license by [employer] (which disclaims liability or warranty for this information). If you have questions about a medical condition or this instruction, always ask your healthcare professional. [place] [address] any warranty or liability for your use of this information. Advance Directives: Care Instructions Your Care Instructions An advance directive is a legal way to state your wishes at the end of your life. It tells your family and your doctor what to do if you can no longer say what you want. There are two main types of advance directives. You can change them any time that your wishes change. · A living will tells your family and your doctor your wishes about life support and other treatment. · A durable power of  for health care lets you name a person to make treatment decisions for you when you can't speak for yourself. This person is called a health care agent. If you do not have an advance directive, decisions about your medical care may be made by a doctor or a  who doesn't know you. It may help to think of an advance directive as a gift to the people who care for you. If you have one, they won't have to make tough decisions by themselves. Follow-up care is a key part of your treatment and safety. Be sure to make and go to all appointments, and call your doctor if you are having problems. It's also a good idea to know your test results and keep a list of the medicines you take. How can you care for yourself at home? · Discuss your wishes with your loved ones and your doctor. This way, there are no surprises. · Many states have a unique form. Or you might use a universal form that has been approved by many states. This kind of form can sometimes be completed and stored online. Your electronic copy will then be available wherever you have a connection to the Internet. In most cases, doctors will respect your wishes even if you have a form from a different state. · You don't need a  to do an advance directive. But you may want to get legal advice. · Think about these questions when you prepare an advance directive: ¨ Who do you want to make decisions about your medical care if you are not able to? Many people choose a family member or close friend. ¨ Do you know enough about life support methods that might be used? If not, talk to your doctor so you understand. ¨ What are you most afraid of that might happen? You might be afraid of having pain, losing your independence, or being kept alive by machines. ¨ Where would you prefer to die? Choices include your home, a hospital, or a nursing home. ¨ Would you like to have information about hospice care to support you and your family? ¨ Do you want to donate organs when you die? ¨ Do you want certain Jain practices performed before you die? If so, put your wishes in the advance directive. · Read your advance directive every year, and make changes as needed. When should you call for help? Be sure to contact your doctor if you have any questions. Where can you learn more? Go to http://beverly-gume.info/. Enter R264 in the search box to learn more about \"Advance Directives: Care Instructions. \" Current as of: September 24, 2016 Content Version: 11.4 © 9245-9910 [employer].  Care instructions adapted under license by [employer] (which disclaims liability or warranty for this information). If you have questions about a medical condition or this instruction, always ask your healthcare professional. [address] any warranty or liability for your use of this information. PRESCRIPTION REFILL POLICY [facility] Statement to Patients April 1, 2014 In an effort to ensure the large volume of patient prescription refills is processed in the most efficient and expeditious manner, we are asking our patients to assist us by calling your Pharmacy for all prescription refills, this will include also your  Mail Order Pharmacy. The pharmacy will contact our office electronically to continue the refill process. Please do not wait until the last minute to call your pharmacy. We need at least 48 hours (2days) to fill prescriptions. We also encourage you to call your pharmacy before going to  your prescription to make sure it is ready. With regard to controlled substance prescription refill requests (narcotic refills) that need to be picked up at our office, we ask your cooperation by providing us with at least 72 hours (3days) notice that you will need a refill. We will not refill narcotic prescription refill requests after 4:00pm on any weekday, Monday through Thursday, or after 2:00pm on Fridays, or on the weekends. We encourage everyone to explore another way of getting your prescription refill request processed using Trunkbow, our patient web portal through our electronic medical record system. Trunkbow is an efficient and effective way to communicate your medication request directly to the office and  downloadable as an meron on your smart phone . Trunkbow also features a review functionality that allows you to view your medication list as well as leave messages for your physician. Are you ready to get connected? If so please review the attatched instructions or speak to any of our staff to get you set up right away! Thank you so much for your cooperation. Should you have any questions please contact our Practice Administrator. The Physicians and Staff,  [facility] Patient Instruction Plan/ Result Policy If we have ordered testing for you, know that; \"NO NEWS IS GOOD NEWS! \" It is our policy that we know longer call patients with results, nor do we  give test results over the phone. We schedule follow up appointments so that your results can be discussed in person. This allows you to address any questions you have regarding the results. If something of concern is revealed on your test, we will contact you to discuss the matter and if needed schedule a sooner follow up appointment. Additionally, results may be found by using the My Chart feature and one of our patient service representatives at the  can give you instructions on how to access this feature to utilize our electronic medical record system. Thank you for your understanding. Introducing [facility] SERVICES! Dear Nita De Guzman: 
Thank you for requesting a Quintiles account. Our records indicate that you already have an active Quintiles account. You can access your account anytime at [email or web address] Did you know that you can access your hospital and ER discharge instructions at any time in Quintiles? You can also review all of your test results from your hospital stay or ER visit. Additional Information If you have questions, please visit the Frequently Asked Questions section of the Quintiles website at [email or web address] Remember, Quintiles is NOT to be used for urgent needs. For medical emergencies, dial 911. Now available from your iPhone and Android! Please provide this summary of care documentation to your next provider. Your primary care clinician is listed as Kelsey Gutierrez. If you have any questions after today's visit, please call [phone].

## 2018-05-22 NOTE — LETTER
Chief Complaint Patient presents with  Multiple Sclerosis 1. Have you been to the ER, urgent care clinic since your last visit? Hospitalized since your last visit? No 
 
2. Have you seen or consulted any other health care providers outside of the [address] [place] [address] since your last visit? Include any pap smears or colon screening. No  
 
Reviewed record in preparation for visit and have necessary documentation Pt did not bring medication to office visit for review Medication list reviewed and reconciled with patient Information was given to pt on Advanced Directives, Living Will 
opportunity was given for questions Neurology Progress Note Patient ID: Chacorta Martin IV 
[id number] 
38 y.o. 
1975 HISTORY PROVIDED BY: 
Patient Chief Complaint: CIS Subjective:   
 
Mr. 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 He is doing well on the Avonex. He takes it every Friday. If he doesn't hydrate he will feel kind of off. He is working with no issues. He denies any flares. He did have an MRI of the brain that showed a new lesion last May, but he did not have any symptoms from this, and did not want to change therapy at that time. He is on the free drug program with Avonex so would prefer to be on it possible. He denies any fatigue. He denies any memory changes. Overall he reports feeling very well. Recap: 
He is on avonex. Since last visit he did have updated imaging with MRI of the brain. It did show one new area of left frontal white matter change not previously seen. No enhancement. Patient denies any symptoms from this. Patient denies any focal numbness or weakness. He reports his vision is stable. He denies headaches. He reports he is doing well on his job with no complications. He takes Xanax on Fridays. His eyes this is well hydrated he has no issues with this. He did forget to get blood work at last visit we will get this today. Objective:  
ROS: 
Per HPI- 
Otherwise 12 point ROS was negative Meds: 
Current Outpatient Prescriptions on File Prior to Visit Medication Sig Dispense Refill  atorvastatin (LIPITOR) 40 mg tablet take 1 tablet by mouth once daily 90 Tab 2  ibuprofen (MOTRIN) 800 mg tablet Take 1 Tab by mouth every eight (8) hours as needed for Pain. 30 Tab 0  
 interferon beta-1a (AVONEX) 30 mcg/0.5 mL injection Inject 30 mcg intramuscularly once a week as directed 4 Kit 6 No current facility-administered medications on file prior to visit. Imaging: MRI brain in May showed one new left frontal lesion with no enhancement otherwise unchanged (I personally reviewed these images in PACS and this is my impression) MRI C spine: normal 
 
Reviewed records in [place] and media tab today- ophtho appt reviewed and pt had normal exam 
 
Lab Review Results for orders placed or performed in visit on 11/21/17 CBC WITH AUTOMATED DIFF Result Value Ref Range WBC 6.5 3.4 - 10.8 x10E3/uL  
 RBC 4.88 4.14 - 5.80 x10E6/uL HGB 14.9 12.6 - 17.7 g/dL HCT 43.0 37.5 - 51.0 % MCV 88 79 - 97 fL  
 MCH 30.5 26.6 - 33.0 pg  
 MCHC 34.7 31.5 - 35.7 g/dL  
 RDW 13.1 12.3 - 15.4 % PLATELET 725 088 - 255 x10E3/uL NEUTROPHILS 69 Not Estab. % Lymphocytes 22 Not Estab. % MONOCYTES 7 Not Estab. % EOSINOPHILS 1 Not Estab. % BASOPHILS 0 Not Estab. %  
 ABS. NEUTROPHILS 4.5 1.4 - 7.0 x10E3/uL Abs Lymphocytes 1.4 0.7 - 3.1 x10E3/uL  
 ABS. MONOCYTES 0.5 0.1 - 0.9 x10E3/uL  
 ABS. EOSINOPHILS 0.1 0.0 - 0.4 x10E3/uL  
 ABS. BASOPHILS 0.0 0.0 - 0.2 x10E3/uL IMMATURE GRANULOCYTES 1 Not Estab. %  
 ABS. IMM. GRANS. 0.0 0.0 - 0.1 x10E3/uL METABOLIC PANEL, COMPREHENSIVE Result Value Ref Range Glucose 80 65 - 99 mg/dL BUN 11 6 - 24 mg/dL Creatinine 0.66 (L) 0.76 - 1.27 mg/dL  GFR est non- >59 mL/min/1.73  
 GFR est  >59 mL/min/1.73  
 BUN/Creatinine ratio 17 9 - 20 Sodium 143 134 - 144 mmol/L Potassium 4.4 3.5 - 5.2 mmol/L Chloride 100 96 - 106 mmol/L  
 CO2 29 18 - 29 mmol/L Calcium 9.3 8.7 - 10.2 mg/dL Protein, total 6.8 6.0 - 8.5 g/dL Albumin 4.1 3.5 - 5.5 g/dL GLOBULIN, TOTAL 2.7 1.5 - 4.5 g/dL A-G Ratio 1.5 1.2 - 2.2 Bilirubin, total 0.3 0.0 - 1.2 mg/dL Alk. phosphatase 67 39 - 117 IU/L  
 AST (SGOT) 26 0 - 40 IU/L  
 ALT (SGPT) 37 0 - 44 IU/L Exam: 
Visit Vitals  /72  Pulse 69  Resp 18  Ht 5' 11\" (1.803 m)  Wt 120.2 kg (265 lb)  SpO2 97%  BMI 36.96 kg/m2 Gen: Well developed CV: RRR Lungs: non labored breathing Abd: non distending Neuro: A&O x 3, no dysarthria or aphasia CN II-XII: PERRL, EOMI, face symmetric, tongue/palate midline Motor: strength 5/5 all four ext Sensory: intact to LT Gait: normal 
 
Assessment:  
 
Mehrdad Loera is  a 37 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 Currently he is on Avonex and doing well. Monitoring labs were stable. Will recheck MRI of the brain and it has additional lesions, will discuss changing therapy. Plan: 1. Continue on Avonex. Patient is on a free drug program 
2. Continue to pre-medicate side effects 3. Encouraged to stay physically active 4. Plan for MRI of the brain with and without contrast 
5. Monitoring labs stable 6. Cont with ophthalmology FU 6 months Apolonia Lama MD 
5/22/2018 
4:09 PM 
 
 
This note was created using voice recognition software. Despite editing, there may be syntax errors. This note will not be viewable in [address]

## 2018-05-22 NOTE — PROGRESS NOTES
Chief Complaint   Patient presents with    Multiple Sclerosis     1. Have you been to the ER, urgent care clinic since your last visit? Hospitalized since your last visit? No    2. Have you seen or consulted any other health care providers outside of the [address] [place] [address] since your last visit? Include any pap smears or colon screening.  No     Reviewed record in preparation for visit and have necessary documentation  Pt did not bring medication to office visit for review  Medication list reviewed and reconciled with patient  Information was given to pt on Advanced Directives, Living Will  opportunity was given for questions

## 2018-05-22 NOTE — PATIENT INSTRUCTIONS
Learning About Zak Driscoll  What is a living will? A living will is a legal form you use to write down the kind of care you want at the end of your life. It is used by the health professionals who will treat you if you aren't able to decide for yourself. If you put your wishes in writing, your loved ones and others will know what kind of care you want. They won't need to guess. This can ease your mind and be helpful to others. A living will is not the same as an estate or property will. An estate will explains what you want to happen with your money and property after you die. Is a living will a legal document? A living will is a legal document. Each state has its own laws about living [place]. If you move to another state, make sure that your living will is legal in the state where you now live. Or you might use a universal form that has been approved by many states. This kind of form can sometimes be completed and stored online. Your electronic copy will then be available wherever you have a connection to the Internet. In most cases, doctors will respect your wishes even if you have a form from a different state. · You don't need an  to complete a living will. But legal advice can be helpful if your state's laws are unclear, your health history is complicated, or your family can't agree on what should be in your living will. · You can change your living will at any time. Some people find that their wishes about end-of-life care change as their health changes. · In addition to making a living will, think about completing a medical power of  form. This form lets you name the person you want to make end-of-life treatment decisions for you (your \"health care agent\") if you're not able to. Many hospitals and nursing homes will give you the forms you need to complete a living will and a medical power of .   · Your living will is used only if you can't make or communicate decisions for yourself anymore. If you become able to make decisions again, you can accept or refuse any treatment, no matter what you wrote in your living will. · Your state may offer an online registry. This is a place where you can store your living will online so the doctors and nurses who need to treat you can find it right away. What should you think about when creating a living will? Talk about your end-of-life wishes with your family members and your doctor. Let them know what you want. That way the people making decisions for you won't be surprised by your choices. Think about these questions as you make your living will:  · Do you know enough about life support methods that might be used? If not, talk to your doctor so you know what might be done if you can't breathe on your own, your heart stops, or you're unable to swallow. · What things would you still want to be able to do after you receive life-support methods? Would you want to be able to walk? To speak? To eat on your own? To live without the help of machines? · If you have a choice, where do you want to be cared for? In your home? At a hospital or nursing home? · Do you want certain Holiness practices performed if you become very ill? · If you have a choice at the end of your life, where would you prefer to die? At home? In a hospital or nursing home? Somewhere else? · Would you prefer to be buried or cremated? · Do you want your organs to be donated after you die? What should you do with your living will? · Make sure that your family members and your health care agent have copies of your living will. · Give your doctor a copy of your living will to keep in your medical record. If you have more than one doctor, make sure that each one has a copy. · You may want to put a copy of your living will where it can be easily found. Where can you learn more? Go to http://beverly-gume.info/.   Enter V507 in the search box to learn more about \"Learning About Living Martina. \"  Current as of: September 24, 2016  Content Version: 11.4  © 5476-1460 [employer]. Care instructions adapted under license by [employer] (which disclaims liability or warranty for this information). If you have questions about a medical condition or this instruction, always ask your healthcare professional. [address] any warranty or liability for your use of this information. Advance Directives: Care Instructions  Your Care Instructions  An advance directive is a legal way to state your wishes at the end of your life. It tells your family and your doctor what to do if you can no longer say what you want. There are two main types of advance directives. You can change them any time that your wishes change. · A living will tells your family and your doctor your wishes about life support and other treatment. · A durable power of  for health care lets you name a person to make treatment decisions for you when you can't speak for yourself. This person is called a health care agent. If you do not have an advance directive, decisions about your medical care may be made by a doctor or a  who doesn't know you. It may help to think of an advance directive as a gift to the people who care for you. If you have one, they won't have to make tough decisions by themselves. Follow-up care is a key part of your treatment and safety. Be sure to make and go to all appointments, and call your doctor if you are having problems. It's also a good idea to know your test results and keep a list of the medicines you take. How can you care for yourself at home? · Discuss your wishes with your loved ones and your doctor. This way, there are no surprises. · Many states have a unique form. Or you might use a universal form that has been approved by many states. This kind of form can sometimes be completed and stored online.  Your electronic copy will then be available wherever you have a connection to the Internet. In most cases, doctors will respect your wishes even if you have a form from a different state. · You don't need a  to do an advance directive. But you may want to get legal advice. · Think about these questions when you prepare an advance directive:  ¨ Who do you want to make decisions about your medical care if you are not able to? Many people choose a family member or close friend. ¨ Do you know enough about life support methods that might be used? If not, talk to your doctor so you understand. ¨ What are you most afraid of that might happen? You might be afraid of having pain, losing your independence, or being kept alive by machines. ¨ Where would you prefer to die? Choices include your home, a hospital, or a nursing home. ¨ Would you like to have information about hospice care to support you and your family? ¨ Do you want to donate organs when you die? ¨ Do you want certain Mandaen practices performed before you die? If so, put your wishes in the advance directive. · Read your advance directive every year, and make changes as needed. When should you call for help? Be sure to contact your doctor if you have any questions. Where can you learn more? Go to http://beverly-gume.info/. Enter R264 in the search box to learn more about \"Advance Directives: Care Instructions. \"  Current as of: September 24, 2016  Content Version: 11.4  © 8513-8318 [employer]. Care instructions adapted under license by [employer] (which disclaims liability or warranty for this information). If you have questions about a medical condition or this instruction, always ask your healthcare professional. [place] [address] any warranty or liability for your use of this information.   [facility]   Statement to Patients  April 1, 2014      In an effort to ensure the large volume of patient prescription refills is processed in the most efficient and expeditious manner, we are asking our patients to assist us by calling your Pharmacy for all prescription refills, this will include also your  Mail Order Pharmacy. The pharmacy will contact our office electronically to continue the refill process. Please do not wait until the last minute to call your pharmacy. We need at least 48 hours (2days) to fill prescriptions. We also encourage you to call your pharmacy before going to  your prescription to make sure it is ready. With regard to controlled substance prescription refill requests (narcotic refills) that need to be picked up at our office, we ask your cooperation by providing us with at least 72 hours (3days) notice that you will need a refill. We will not refill narcotic prescription refill requests after 4:00pm on any weekday, Monday through Thursday, or after 2:00pm on Fridays, or on the weekends. We encourage everyone to explore another way of getting your prescription refill request processed using [employer], our patient web portal through our electronic medical record system. [employer] is an efficient and effective way to communicate your medication request directly to the office and  downloadable as an meron on your smart phone . [employer] also features a review functionality that allows you to view your medication list as well as leave messages for your physician. Are you ready to get connected? If so please review the attatched instructions or speak to any of our staff to get you set up right away! Thank you so much for your cooperation. Should you have any questions please contact our Practice Administrator. The Physicians and Staff,  [facility]   Patient Instruction Plan/ Result Policy    If we have ordered testing for you, know that; [facility] NEWS IS GOOD NEWS! \" It is our policy that we know longer call patients with results, nor do we  give test results over the phone. We schedule follow up appointments so that your results can be discussed in person. This allows you to address any questions you have regarding the results. If something of concern is revealed on your test, we will contact you to discuss the matter and if needed schedule a sooner follow up appointment. Additionally, results may be found by using the My Chart feature and one of our patient service representatives at the  can give you instructions on how to access this feature to utilize our electronic medical record system. Thank you for your understanding.

## 2018-06-14 ENCOUNTER — HOSPITAL ENCOUNTER (OUTPATIENT)
Dept: MRI IMAGING | Age: 43
Discharge: HOME | End: 2018-06-14
Attending: PSYCHIATRY & NEUROLOGY
Payer: COMMERCIAL

## 2018-06-14 DIAGNOSIS — G37.9 CLINICALLY ISOLATED SYNDROME (HCC): ICD-10-CM

## 2018-06-14 PROCEDURE — 74011250636 HC RX REV CODE- 250/636: Performed by: PSYCHIATRY & NEUROLOGY

## 2018-06-14 PROCEDURE — 70553 MRI BRAIN STEM W/O & W/DYE: CPT

## 2018-06-14 PROCEDURE — A9575 INJ GADOTERATE MEGLUMI 0.1ML: HCPCS | Performed by: PSYCHIATRY & NEUROLOGY

## 2018-06-14 RX ORDER — GADOTERATE MEGLUMINE 376.9 MG/ML
20 INJECTION INTRAVENOUS
Status: COMPLETED | OUTPATIENT
Start: 2018-06-14 | End: 2018-06-14

## 2018-06-14 RX ADMIN — GADOTERATE MEGLUMINE 20 ML: 376.9 INJECTION INTRAVENOUS at 17:07

## 2018-11-20 ENCOUNTER — OFFICE VISIT (OUTPATIENT)
Dept: NEUROLOGY | Age: 43
End: 2018-11-20

## 2018-11-20 VITALS
HEIGHT: 71 IN | RESPIRATION RATE: 18 BRPM | WEIGHT: 275 LBS | OXYGEN SATURATION: 95 % | HEART RATE: 75 BPM | BODY MASS INDEX: 38.5 KG/M2 | SYSTOLIC BLOOD PRESSURE: 132 MMHG | DIASTOLIC BLOOD PRESSURE: 74 MMHG

## 2018-11-20 DIAGNOSIS — G37.9 CLINICALLY ISOLATED SYNDROME (HCC): Primary | ICD-10-CM

## 2018-11-20 NOTE — LETTER
Chief Complaint Patient presents with  
 Other Everything is good no changes/ CIS 1. Have you been to the ER, urgent care clinic since your last visit? Hospitalized since your last visit? No 
 
2. Have you seen or consulted any other health care providers outside of the [address] [place] [address] since your last visit? Include any pap smears or colon screening. No  
 
Visit Vitals /74 Pulse 75 Resp 18 Ht 5' 11\" (1.803 m) Wt 124.7 kg (275 lb) SpO2 95% BMI 38.35 kg/m² Neurology Progress Note Patient ID: Tika Murdock IV 
[id number] 
37 y.o. 
1975 HISTORY PROVIDED BY: 
Patient Chief Complaint: CIS Subjective:   
 
Mr. 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 He is doing very well on Avonex. He has not had any flares or progression to MS at this point. Vision is good. No diplopia. MRI brain was rechecked in June and this was negative for any new lesions. We will wait for 2 years now to recheck again. Avoenx is his DMT. He takes it weekly. No flu like issues if he is hydrated enough. He is on the free drug program and this is working well for him. No fatigue or issues with concentration. He has no bowel or bladder dysfunction. 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 He is doing well on the Avonex. He takes it every Friday. If he doesn't hydrate he will feel kind of off. He is working with no issues. He denies any flares. He did have an MRI of the brain that showed a new lesion last May, but he did not have any symptoms from this, and did not want to change therapy at that time. He is on the free drug program with Avonex so would prefer to be on it possible. He denies any fatigue. He denies any memory changes. Overall he reports feeling very well. Objective:  
ROS: 
Per HPI- 
Otherwise 12 point ROS was negative Meds: Current Outpatient Medications on File Prior to Visit Medication Sig Dispense Refill  atorvastatin (LIPITOR) 40 mg tablet take 1 tablet by mouth once daily 90 Tab 2  ibuprofen (MOTRIN) 800 mg tablet Take 1 Tab by mouth every eight (8) hours as needed for Pain. 30 Tab 0  
 interferon beta-1a (AVONEX) 30 mcg/0.5 mL injection Inject 30 mcg intramuscularly once a week as directed 4 Kit 6 No current facility-administered medications on file prior to visit. Imaging: MRI brain (June/18): No new lesions (I personally reviewed these images in PACS and this is my impression) MRI brain in May showed one new left frontal lesion with no enhancement otherwise unchanged (I personally reviewed these images in PACS and this is my impression) MRI C spine: normal 
 
Reviewed records in [facility] and media tab today- ophtho appt reviewed and pt had normal exam 
 
Lab Review Results for orders placed or performed in visit on 11/21/17 CBC WITH AUTOMATED DIFF Result Value Ref Range WBC 6.5 3.4 - 10.8 x10E3/uL  
 RBC 4.88 4.14 - 5.80 x10E6/uL HGB 14.9 12.6 - 17.7 g/dL HCT 43.0 37.5 - 51.0 % MCV 88 79 - 97 fL  
 MCH 30.5 26.6 - 33.0 pg  
 MCHC 34.7 31.5 - 35.7 g/dL  
 RDW 13.1 12.3 - 15.4 % PLATELET 507 204 - 256 x10E3/uL NEUTROPHILS 69 Not Estab. % Lymphocytes 22 Not Estab. % MONOCYTES 7 Not Estab. % EOSINOPHILS 1 Not Estab. % BASOPHILS 0 Not Estab. %  
 ABS. NEUTROPHILS 4.5 1.4 - 7.0 x10E3/uL Abs Lymphocytes 1.4 0.7 - 3.1 x10E3/uL  
 ABS. MONOCYTES 0.5 0.1 - 0.9 x10E3/uL  
 ABS. EOSINOPHILS 0.1 0.0 - 0.4 x10E3/uL  
 ABS. BASOPHILS 0.0 0.0 - 0.2 x10E3/uL IMMATURE GRANULOCYTES 1 Not Estab. %  
 ABS. IMM. GRANS. 0.0 0.0 - 0.1 x10E3/uL METABOLIC PANEL, COMPREHENSIVE Result Value Ref Range Glucose 80 65 - 99 mg/dL BUN 11 6 - 24 mg/dL Creatinine 0.66 (L) 0.76 - 1.27 mg/dL GFR est non- >59 mL/min/1.73  GFR est  >59 mL/min/1.73  
 BUN/Creatinine ratio 17 9 - 20 Sodium 143 134 - 144 mmol/L Potassium 4.4 3.5 - 5.2 mmol/L Chloride 100 96 - 106 mmol/L  
 CO2 29 18 - 29 mmol/L Calcium 9.3 8.7 - 10.2 mg/dL Protein, total 6.8 6.0 - 8.5 g/dL Albumin 4.1 3.5 - 5.5 g/dL GLOBULIN, TOTAL 2.7 1.5 - 4.5 g/dL A-G Ratio 1.5 1.2 - 2.2 Bilirubin, total 0.3 0.0 - 1.2 mg/dL Alk. phosphatase 67 39 - 117 IU/L  
 AST (SGOT) 26 0 - 40 IU/L  
 ALT (SGPT) 37 0 - 44 IU/L Exam: 
Visit Vitals /74 Pulse 75 Resp 18 Ht 5' 11\" (1.803 m) Wt 124.7 kg (275 lb) SpO2 95% BMI 38.35 kg/m² Gen: Well developed CV: RRR Lungs: non labored breathing Abd: non distending Neuro: A&O x 3, no dysarthria or aphasia CN II-XII: PERRL, EOMI, face symmetric, tongue/palate midline Motor: strength 5/5 all four ext Sensory: intact to LT Gait: normal 
 
Assessment:  
 
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 Currently he is on Avonex and doing well. Monitoring labs have been stable. We will recheck today. Repeat MRI of the brain showed no new lesions. We will continue current therapy. Plan: 1. Continue on Avonex. Patient is on a free drug program.  No refill needed today 2. Continue to pre-medicate and hydrate to avoid side effects 3. Encouraged to stay physically active 4. MRI brain negative. Plan will be repeat in 2 years unless he has flares 5. Monitoring labs stable from last year. We will repeat today. 6. Cont with ophthalmology FU one year or sooner if needed Kecia Manzo MD 
11/20/2018 
4:09 PM 
 
 
Medications and side effects discussed with patient in detail. With any new medications prescribed, patient was given instructions on administration and side effects. Written medication information was provided to the patient as well. This note was created using voice recognition software. Despite editing, there may be syntax errors. This note will not be viewable in [address]

## 2018-11-20 NOTE — PROGRESS NOTES
Chief Complaint Patient presents with  
 Other Everything is good no changes/ CIS 1. Have you been to the ER, urgent care clinic since your last visit? Hospitalized since your last visit? No 
 
2. Have you seen or consulted any other health care providers outside of the [address] [place] [address] since your last visit? Include any pap smears or colon screening. No  
 
Visit Vitals /74 Pulse 75 Resp 18 Ht 5' 11\" (1.803 m) Wt 124.7 kg (275 lb) SpO2 95% BMI 38.35 kg/m²

## 2018-11-20 NOTE — PROGRESS NOTES
Neurology Progress Note Patient ID: Rhoda Morrow IV 
[id number] 
37 y.o. 
1975 HISTORY PROVIDED BY: 
Patient Chief Complaint: CIS Subjective:   
 
Mr. 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 He is doing very well on Avonex. He has not had any flares or progression to MS at this point. Vision is good. No diplopia. MRI brain was rechecked in June and this was negative for any new lesions. We will wait for 2 years now to recheck again. Avoenx is his DMT. He takes it weekly. No flu like issues if he is hydrated enough. He is on the free drug program and this is working well for him. No fatigue or issues with concentration. He has no bowel or bladder dysfunction. 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 He is doing well on the Avonex. He takes it every Friday. If he doesn't hydrate he will feel kind of off. He is working with no issues. He denies any flares. He did have an MRI of the brain that showed a new lesion last May, but he did not have any symptoms from this, and did not want to change therapy at that time. He is on the free drug program with Avonex so would prefer to be on it possible. He denies any fatigue. He denies any memory changes. Overall he reports feeling very well. Objective:  
ROS: 
Per HPI- 
Otherwise 12 point ROS was negative Meds: 
Current Outpatient Medications on File Prior to Visit Medication Sig Dispense Refill  atorvastatin (LIPITOR) 40 mg tablet take 1 tablet by mouth once daily 90 Tab 2  ibuprofen (MOTRIN) 800 mg tablet Take 1 Tab by mouth every eight (8) hours as needed for Pain. 30 Tab 0  
 interferon beta-1a (AVONEX) 30 mcg/0.5 mL injection Inject 30 mcg intramuscularly once a week as directed 4 Kit 6 No current facility-administered medications on file prior to visit. Imaging: MRI brain (June/18):  No new lesions (I personally reviewed these images in PACS and this is my impression) MRI brain in May showed one new left frontal lesion with no enhancement otherwise unchanged (I personally reviewed these images in PACS and this is my impression) MRI C spine: normal 
 
Reviewed records in [employer] and media tab today- ophtho appt reviewed and pt had normal exam 
Lab Review Results for orders placed or performed in visit on 11/21/17 CBC WITH AUTOMATED DIFF Result Value Ref Range WBC 6.5 3.4 - 10.8 x10E3/uL  
 RBC 4.88 4.14 - 5.80 x10E6/uL HGB 14.9 12.6 - 17.7 g/dL HCT 43.0 37.5 - 51.0 % MCV 88 79 - 97 fL  
 MCH 30.5 26.6 - 33.0 pg  
 MCHC 34.7 31.5 - 35.7 g/dL  
 RDW 13.1 12.3 - 15.4 % PLATELET 473 931 - 910 x10E3/uL NEUTROPHILS 69 Not Estab. % Lymphocytes 22 Not Estab. % MONOCYTES 7 Not Estab. % EOSINOPHILS 1 Not Estab. % BASOPHILS 0 Not Estab. %  
 ABS. NEUTROPHILS 4.5 1.4 - 7.0 x10E3/uL Abs Lymphocytes 1.4 0.7 - 3.1 x10E3/uL  
 ABS. MONOCYTES 0.5 0.1 - 0.9 x10E3/uL  
 ABS. EOSINOPHILS 0.1 0.0 - 0.4 x10E3/uL  
 ABS. BASOPHILS 0.0 0.0 - 0.2 x10E3/uL IMMATURE GRANULOCYTES 1 Not Estab. %  
 ABS. IMM. GRANS. 0.0 0.0 - 0.1 x10E3/uL METABOLIC PANEL, COMPREHENSIVE Result Value Ref Range Glucose 80 65 - 99 mg/dL BUN 11 6 - 24 mg/dL Creatinine 0.66 (L) 0.76 - 1.27 mg/dL GFR est non- >59 mL/min/1.73 GFR est  >59 mL/min/1.73  
 BUN/Creatinine ratio 17 9 - 20 Sodium 143 134 - 144 mmol/L Potassium 4.4 3.5 - 5.2 mmol/L Chloride 100 96 - 106 mmol/L  
 CO2 29 18 - 29 mmol/L Calcium 9.3 8.7 - 10.2 mg/dL Protein, total 6.8 6.0 - 8.5 g/dL Albumin 4.1 3.5 - 5.5 g/dL GLOBULIN, TOTAL 2.7 1.5 - 4.5 g/dL A-G Ratio 1.5 1.2 - 2.2 Bilirubin, total 0.3 0.0 - 1.2 mg/dL Alk. phosphatase 67 39 - 117 IU/L  
 AST (SGOT) 26 0 - 40 IU/L  
 ALT (SGPT) 37 0 - 44 IU/L Exam: 
Visit Vitals /74 Pulse 75 Resp 18 Ht 5' 11\" (1.803 m) Wt 124.7 kg (275 lb) SpO2 95% BMI 38.35 kg/m² Gen: Well developed CV: RRR Lungs: non labored breathing Abd: non distending Neuro: A&O x 3, no dysarthria or aphasia CN II-XII: PERRL, EOMI, face symmetric, tongue/palate midline Motor: strength 5/5 all four ext Sensory: intact to LT Gait: normal 
 
Assessment:  
 
Kenisha Serrato is  a 39 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 Currently he is on Avonex and doing well. Monitoring labs have been stable. We will recheck today. Repeat MRI of the brain showed no new lesions. We will continue current therapy. Plan: 1. Continue on Avonex. Patient is on a free drug program.  No refill needed today 2. Continue to pre-medicate and hydrate to avoid side effects 3. Encouraged to stay physically active 4. MRI brain negative. Plan will be repeat in 2 years unless he has flares 5. Monitoring labs stable from last year. We will repeat today. 6. Cont with ophthalmology FU one year or sooner if needed Rebel Pulido MD 
11/20/2018 
4:09 PM 
 
 
Medications and side effects discussed with patient in detail. With any new medications prescribed, patient was given instructions on administration and side effects. Written medication information was provided to the patient as well. This note was created using voice recognition software. Despite editing, there may be syntax errors. This note will not be viewable in [address]

## 2018-11-20 NOTE — PATIENT INSTRUCTIONS
PRESCRIPTION REFILL POLICY [facility] Statement to Patients April 1, 2014 In an effort to ensure the large volume of patient prescription refills is processed in the most efficient and expeditious manner, we are asking our patients to assist us by calling your Pharmacy for all prescription refills, this will include also your  Mail Order Pharmacy. The pharmacy will contact our office electronically to continue the refill process. Please do not wait until the last minute to call your pharmacy. We need at least 48 hours (2days) to fill prescriptions. We also encourage you to call your pharmacy before going to  your prescription to make sure it is ready. With regard to controlled substance prescription refill requests (narcotic refills) that need to be picked up at our office, we ask your cooperation by providing us with at least 72 hours (3days) notice that you will need a refill. We will not refill narcotic prescription refill requests after 4:00pm on any weekday, Monday through Thursday, or after 2:00pm on Fridays, or on the weekends. We encourage everyone to explore another way of getting your prescription refill request processed using [employer], our patient web portal through our electronic medical record system. [employer] is an efficient and effective way to communicate your medication request directly to the office and  downloadable as an meron on your smart phone . [employer] also features a review functionality that allows you to view your medication list as well as leave messages for your physician. Are you ready to get connected? If so please review the attatched instructions or speak to any of our staff to get you set up right away! Thank you so much for your cooperation. Should you have any questions please contact our Practice Administrator. The Physicians and Staff,  [facility] Patient Instruction Plan/ Result Policy If we have ordered testing for you, know that; \"NO NEWS IS GOOD NEWS! \" It is our policy that we know longer call patients with results, nor do we  give test results over the phone. We schedule follow up appointments so that your results can be discussed in person. This allows you to address any questions you have regarding the results. If something of concern is revealed on your test, we will contact you to discuss the matter and if needed schedule a sooner follow up appointment. Additionally, results may be found by using the My Chart feature and one of our patient service representatives at the  can give you instructions on how to access this feature to utilize our electronic medical record system. Thank you for your understanding.

## 2018-11-21 DIAGNOSIS — G37.9 CLINICALLY ISOLATED SYNDROME (HCC): ICD-10-CM

## 2018-11-21 LAB
ALBUMIN SERPL-MCNC: 4.3 G/DL (ref 3.5–5.5)
ALBUMIN/GLOB SERPL: 1.6 {RATIO} (ref 1.2–2.2)
ALP SERPL-CCNC: 71 IU/L (ref 39–117)
ALT SERPL-CCNC: 32 IU/L (ref 0–44)
AST SERPL-CCNC: 19 IU/L (ref 0–40)
BASOPHILS # BLD AUTO: 0 X10E3/UL (ref 0–0.2)
BASOPHILS NFR BLD AUTO: 0 %
BILIRUB SERPL-MCNC: 0.4 MG/DL (ref 0–1.2)
BUN SERPL-MCNC: 15 MG/DL (ref 6–24)
BUN/CREAT SERPL: 20 (ref 9–20)
CALCIUM SERPL-MCNC: 9.1 MG/DL (ref 8.7–10.2)
CHLORIDE SERPL-SCNC: 98 MMOL/L (ref 96–106)
CO2 SERPL-SCNC: 27 MMOL/L (ref 20–29)
CREAT SERPL-MCNC: 0.74 MG/DL (ref 0.76–1.27)
EOSINOPHIL # BLD AUTO: 0.1 X10E3/UL (ref 0–0.4)
EOSINOPHIL NFR BLD AUTO: 1 %
ERYTHROCYTE [DISTWIDTH] IN BLOOD BY AUTOMATED COUNT: 13.1 % (ref 12.3–15.4)
GLOBULIN SER CALC-MCNC: 2.7 G/DL (ref 1.5–4.5)
GLUCOSE SERPL-MCNC: 71 MG/DL (ref 65–99)
HCT VFR BLD AUTO: 42.7 % (ref 37.5–51)
HGB BLD-MCNC: 14.8 G/DL (ref 13–17.7)
IMM GRANULOCYTES # BLD: 0 X10E3/UL (ref 0–0.1)
IMM GRANULOCYTES NFR BLD: 0 %
LYMPHOCYTES # BLD AUTO: 1.6 X10E3/UL (ref 0.7–3.1)
LYMPHOCYTES NFR BLD AUTO: 23 %
MCH RBC QN AUTO: 30.8 PG (ref 26.6–33)
MCHC RBC AUTO-ENTMCNC: 34.7 G/DL (ref 31.5–35.7)
MCV RBC AUTO: 89 FL (ref 79–97)
MONOCYTES # BLD AUTO: 0.6 X10E3/UL (ref 0.1–0.9)
MONOCYTES NFR BLD AUTO: 8 %
NEUTROPHILS # BLD AUTO: 4.6 X10E3/UL (ref 1.4–7)
NEUTROPHILS NFR BLD AUTO: 68 %
PLATELET # BLD AUTO: 206 X10E3/UL (ref 150–379)
POTASSIUM SERPL-SCNC: 4.2 MMOL/L (ref 3.5–5.2)
PROT SERPL-MCNC: 7 G/DL (ref 6–8.5)
RBC # BLD AUTO: 4.8 X10E6/UL (ref 4.14–5.8)
SODIUM SERPL-SCNC: 139 MMOL/L (ref 134–144)
WBC # BLD AUTO: 6.8 X10E3/UL (ref 3.4–10.8)

## 2018-12-10 RX ORDER — ATORVASTATIN CALCIUM 40 MG/1
TABLET, FILM COATED ORAL
Qty: 90 TAB | Refills: 1 | Status: SHIPPED | OUTPATIENT
Start: 2018-12-10 | End: 2019-10-28 | Stop reason: SDUPTHER

## 2019-02-21 ENCOUNTER — OFFICE VISIT (OUTPATIENT)
Dept: FAMILY MEDICINE CLINIC | Age: 44
End: 2019-02-21

## 2019-02-21 VITALS
SYSTOLIC BLOOD PRESSURE: 126 MMHG | HEIGHT: 71 IN | BODY MASS INDEX: 39.2 KG/M2 | RESPIRATION RATE: 12 BRPM | DIASTOLIC BLOOD PRESSURE: 83 MMHG | OXYGEN SATURATION: 98 % | HEART RATE: 85 BPM | TEMPERATURE: 97.8 F | WEIGHT: 280 LBS

## 2019-02-21 DIAGNOSIS — Z23 ENCOUNTER FOR IMMUNIZATION: ICD-10-CM

## 2019-02-21 DIAGNOSIS — L84 CALLUS OF FOOT: ICD-10-CM

## 2019-02-21 DIAGNOSIS — G93.31 POST VIRAL SYNDROME: Primary | ICD-10-CM

## 2019-02-21 DIAGNOSIS — R53.83 FATIGUE, UNSPECIFIED TYPE: ICD-10-CM

## 2019-02-21 NOTE — PROGRESS NOTES
HPI  Jeffery Delaney is a 37y.o. year old male patient of Varun Emanuel MD who presents with c/o cold symptoms. Pt has history of has Obesity (BMI 30-39.9), Strabismus, Hypercholesterolemia, Partial third nerve palsy, and Clinically isolated syndrome ([address] on their problem list..    C/o fatigue since last Friday. Felt \"like crap\" at work, drained. Rested all weekend. Felt better Monday but still weak. Knees have been aching. Some stuffiness in sinuses, scratchy throat. Still doesn't feel back to normal. No fevers or chills. Hasn't had flu shot. Denies body aches. Has his next Avanox injection next Friday. No sick contacts that he knows of. Denies sob or cough. Has some persistent sinus congestion, using vicks sinex which helps. Also c/o left foot callus. Using Vaseline. Callus to base of pinky toe. Has hx of MS, follows with Dr. Ailyn Sinclair, gets avanox injections every Friday. Has appt with her next month, requesting lab work today. Last seen in November with below plan verbatim:   1. Continue on Avonex. Patient is on a free drug program.  No refill needed today  2. Continue to pre-medicate and hydrate to avoid side effects  3. Encouraged to stay physically active   4. MRI brain negative. Plan will be repeat in 2 years unless he has flares  5. Monitoring labs stable from last year. We will repeat today. 6. Cont with ophthalmology          Patient Active Problem List   Diagnosis Code    Obesity (BMI 30-39. 9) E66.9    Strabismus H50.9    Hypercholesterolemia E78.00    Partial third nerve palsy H49.00    Clinically isolated syndrome (HCC) G37.9     Past Medical History:   Diagnosis Date    Clinically isolated syndrome (HCC) 6/30/2015    GERD (gastroesophageal reflux disease)     Muscle weakness     Vertigo      Past Surgical History:   Procedure Laterality Date    HX CHOLECYSTECTOMY      HX HERNIA REPAIR Right      Social History     Socioeconomic History    Marital status:      Spouse name: Not on file    Number of children: Not on file    Years of education: Not on file    Highest education level: Not on file   Tobacco Use    Smoking status: Never Smoker    Smokeless tobacco: Never Used   Substance and Sexual Activity    Alcohol use: No    Drug use: No   Social History Narrative    , one son. Family History   Problem Relation Age of Onset    Diabetes Mother     Cancer Mother         Breast    Diabetes Father     Hypertension Father      No Known Allergies    MEDICATIONS  Current Outpatient Medications   Medication Sig    atorvastatin (LIPITOR) 40 mg tablet take 1 tablet by mouth once daily    ibuprofen (MOTRIN) 800 mg tablet Take 1 Tab by mouth every eight (8) hours as needed for Pain.  interferon beta-1a (AVONEX) 30 mcg/0.5 mL injection Inject 30 mcg intramuscularly once a week as directed     No current facility-administered medications for this visit. REVIEW OF SYSTEMS  Per HPI        Visit Vitals  /83 (BP 1 Location: Left arm, BP Patient Position: Sitting)   Pulse 85   Temp 97.8 °F (36.6 °C) (Oral)   Resp 12   Ht 5' 11\" (1.803 m)   Wt 280 lb (127 kg)   SpO2 98%   BMI 39.05 kg/m²         General: Well-developed, well-nourished. In no distress. A&O x 3. Head: Normocephalic, atraumatic. Eyes: Conjunctiva clear. Pupils equal, round, reactive to light. Extraocular movements intact. Ears/Nose: TM's and ear canals normal bilaterally. Nares normal. Septum midline. Normal nasal mucosa. No drainage or sinus tenderness. Mouth/Throat: Lips, mucosa, and tongue normal. Oropharynx benign. Neck: Supple, symmetrical, trachea midline, no lymphadenopathy, no carotid bruits, no JVD, thyroid: not enlarged, symmetric, no tenderness/mass/nodules. Lungs: Clear to auscultation bilaterally. No crackles or wheezes. No use of accessory muscles. Speaks in full sentences without SOB. Chest Wall: No tenderness or deformity.   Heart: RRR, normal S1 and S2, no murmur, click, rub, or gallop. Skin: No rashes. Small callus to plantar aspect of 5th metatarsal.     Neurological: CN II-XII intact. Neurovasc: No edema appreciated. Musculoskeletal: Gait normal.   Psychiatric: Normal mood and affect. Behavior is normal.       ASSESSMENT and PLAN  Diagnoses and all orders for this visit:    1. Post viral syndrome  Seems to be improving, continue hydration and adequate rest.  Recommend flonase for nasal congestion. 2. Fatigue, unspecified type  -     CBC WITH AUTOMATED DIFF  -     METABOLIC PANEL, COMPREHENSIVE  -     TSH RFX ON ABNORMAL TO FREE T4  -r/o other causes of fatigue     3. Callus of foot  Recommend using pumice stone, callus pads OTC, do not pick at callus    4. Encounter for immunization  -     INFLUENZA VIRUS VAC QUAD,SPLIT,PRESV FREE SYRINGE IM  -     [place] IMMUNIZ ADMIN,1 SINGLE/COMB VAC/TOXOID            Patient Instructions     Keep f/u with Dr. Surekha Landry. Drink plenty of fluids and stay hydrated. Get adequate rest.        Fatigue: Care Instructions  Your Care Instructions    Fatigue is a feeling of tiredness, exhaustion, or lack of energy. You may feel fatigue because of too much or not enough activity. It can also come from stress, lack of sleep, boredom, and poor diet. Many medical problems, such as viral infections, can cause fatigue. Emotional problems, especially depression, are often the cause of fatigue. Fatigue is most often a symptom of another problem. Treatment for fatigue depends on the cause. For example, if you have fatigue because you have a certain health problem, treating this problem also treats your fatigue. If depression or anxiety is the cause, treatment may help. Follow-up care is a key part of your treatment and safety. Be sure to make and go to all appointments, and call your doctor if you are having problems. It's also a good idea to know your test results and keep a list of the medicines you take.   How can you care for yourself at home? · Get regular exercise. But don't overdo it. Go back and forth between rest and exercise. · Get plenty of rest.  · Eat a healthy diet. Do not skip meals, especially breakfast.  · Reduce your use of caffeine, tobacco, and alcohol. Caffeine is most often found in coffee, tea, cola drinks, and chocolate. · Limit medicines that can cause fatigue. This includes tranquilizers and cold and allergy medicines. When should you call for help? Watch closely for changes in your health, and be sure to contact your doctor if:    · You have new symptoms such as fever or a rash.     · Your fatigue gets worse.     · You have been feeling down, depressed, or hopeless. Or you may have lost interest in things that you usually enjoy.     · You are not getting better as expected. Where can you learn more? Go to http://beverly-gume.info/. Enter I139 in the search box to learn more about \"Fatigue: Care Instructions. \"  Current as of: September 23, 2018  Content Version: 11.9  © 8727-5367 [employer]. Care instructions adapted under license by [employer] (which disclaims liability or warranty for this information). If you have questions about a medical condition or this instruction, always ask your healthcare professional. [address] any warranty or liability for your use of this information. Corns and Calluses: Care Instructions  Your Care Instructions  Corns and calluses are areas of thick, hard, dead skin. They form to protect your skin from injury. Corns usually form where toes rub together. Calluses often form on the hands or feet. They may form wherever the skin rubs against something, such as shoes. In most cases, you can take steps at home to care for a corn or callus. Follow-up care is a key part of your treatment and safety. Be sure to make and go to all appointments, and call your doctor if you are having problems.  It's also a good idea to know your test results and keep a list of the medicines you take. How can you care for yourself at home? · Wear shoes and other footwear that fit correctly and are roomy. This will reduce rubbing and give corns or calluses time to heal.  · Use protective pads, such as moleskin, to cushion the callus or corn. · Soften the corn or callus and remove the dead skin by using over-the-counter products such as salicylic acid. If you have a condition that causes problems with blood flow (such as peripheral vascular disease) or loss of feeling in your feet (such as diabetes), talk to your doctor before you try any home treatment. · Soak your corn or callus in warm water, and then use a pumice stone to rub dead skin away. · Wash your feet regularly, and rub lotion into your feet while they are still moist. Dry skin can cause a callus to crack and bleed. · Never cut the corn or callus yourself, especially if you have problems with blood flow to your legs or feet. When should you call for help? Call your doctor now or seek immediate medical care if:    · You have signs of infection, such as:  ? Increased pain, swelling, warmth, or redness around the corn or callus. ? Red streaks leading from the corn or callus. ? Pus draining from the corn or callus. ? A fever.    Watch closely for changes in your health, and be sure to contact your doctor if:    · You do not get better as expected. Where can you learn more? Go to http://beverly-gume.info/. Enter R244 in the search box to learn more about \"Corns and Calluses: Care Instructions. \"  Current as of: April 17, 2018  Content Version: 11.9  © 2805-9184 [employer]. Care instructions adapted under license by [email or web address] (which disclaims liability or warranty for this information).  If you have questions about a medical condition or this instruction, always ask your healthcare professional. Kend[employer] disclaims any warranty or liability for your use of this information. Vaccine Information Statement    Influenza (Flu) Vaccine (Inactivated or Recombinant): What you need to know    Many Vaccine Information Statements are available in Turkmen and other languages. See www.immunize.org/vis  Hojas de Información Sobre Vacunas están disponibles en Español y en muchos otros idiomas. Visite www.immunize.org/vis    1. Why get vaccinated? Influenza (flu) is a contagious disease that spreads around the [facility] every year, usually between October and May. Flu is caused by influenza viruses, and is spread mainly by coughing, sneezing, and close contact. Anyone can get flu. Flu strikes suddenly and can last several days. Symptoms vary by age, but can include:   fever/chills   sore throat   muscle aches   fatigue   cough   headache    runny or stuffy nose    Flu can also lead to pneumonia and blood infections, and cause diarrhea and seizures in children. If you have a medical condition, such as heart or lung disease, flu can make it worse. Flu is more dangerous for some people. Infants and young children, people 72years of age and older, pregnant women, and people with certain health conditions or a weakened immune system are at greatest risk. Each year thousands of people in the [facility] die from flu, and many more are hospitalized. Flu vaccine can:   keep you from getting flu,   make flu less severe if you do get it, and   keep you from spreading flu to your family and other people. 2. Inactivated and recombinant flu vaccines    A dose of flu vaccine is recommended every flu season. Children 6 months through 6years of age may need two doses during the same flu season. Everyone else needs only one dose each flu season.        Some inactivated flu vaccines contain a very small amount of a mercury-based preservative called thimerosal. Studies have not shown thimerosal in vaccines to be harmful, but flu vaccines that do not contain thimerosal are available. There is no live flu virus in flu shots. They cannot cause the flu. There are many flu viruses, and they are always changing. Each year a new flu vaccine is made to protect against three or four viruses that are likely to cause disease in the upcoming flu season. But even when the vaccine doesnt exactly match these viruses, it may still provide some protection    Flu vaccine cannot prevent:   flu that is caused by a virus not covered by the vaccine, or   illnesses that look like flu but are not. It takes about 2 weeks for protection to develop after vaccination, and protection lasts through the flu season. 3. Some people should not get this vaccine    Tell the person who is giving you the vaccine:     If you have any severe, life-threatening allergies. If you ever had a life-threatening allergic reaction after a dose of flu vaccine, or have a severe allergy to any part of this vaccine, you may be advised not to get vaccinated. Most, but not all, types of flu vaccine contain a small amount of egg protein.  If you ever had Guillain-Barré Syndrome (also called GBS). Some people with a history of GBS should not get this vaccine. This should be discussed with your doctor.  If you are not feeling well. It is usually okay to get flu vaccine when you have a mild illness, but you might be asked to come back when you feel better. 4. Risks of a vaccine reaction    With any medicine, including vaccines, there is a chance of reactions. These are usually mild and go away on their own, but serious reactions are also possible. Most people who get a flu shot do not have any problems with it.      Minor problems following a flu shot include:    soreness, redness, or swelling where the shot was given     hoarseness   sore, red or itchy eyes   cough   fever   aches   headache   itching   fatigue  If these problems occur, they usually begin soon after the shot and last 1 or 2 days. More serious problems following a flu shot can include the following:     There may be a small increased risk of Guillain-Barré Syndrome (GBS) after inactivated flu vaccine. This risk has been estimated at 1 or 2 additional cases per million people vaccinated. This is much lower than the risk of severe complications from flu, which can be prevented by flu vaccine.  Young children who get the flu shot along with pneumococcal vaccine (PCV13) and/or DTaP vaccine at the same time might be slightly more likely to have a seizure caused by fever. Ask your doctor for more information. Tell your doctor if a child who is getting flu vaccine has ever had a seizure. Problems that could happen after any injected vaccine:      People sometimes faint after a medical procedure, including vaccination. Sitting or lying down for about 15 minutes can help prevent fainting, and injuries caused by a fall. Tell your doctor if you feel dizzy, or have vision changes or ringing in the ears.  Some people get severe pain in the shoulder and have difficulty moving the arm where a shot was given. This happens very rarely.  Any medication can cause a severe allergic reaction. Such reactions from a vaccine are very rare, estimated at about 1 in a million doses, and would happen within a few minutes to a few hours after the vaccination. As with any medicine, there is a very remote chance of a vaccine causing a serious injury or death. The safety of vaccines is always being monitored. For more information, visit: www.cdc.gov/vaccinesafety/    5. What if there is a serious reaction? What should I look for?  Look for anything that concerns you, such as signs of a severe allergic reaction, very high fever, or unusual behavior.     Signs of a severe allergic reaction can include hives, swelling of the face and throat, difficulty breathing, a fast heartbeat, dizziness, and weakness - usually within a few minutes to a few hours after the vaccination. What should I do?  If you think it is a severe allergic reaction or other emergency that cant wait, call 9-1-1 and get the person to the nearest hospital. Otherwise, call your doctor.  Reactions should be reported to the Vaccine Adverse Event Reporting System (VAERS). Your doctor should file this report, or you can do it yourself through  the VAERS web site at www.vaers. [facility].gov, or by calling 4-321.850.1476. VAERS does not give medical advice. 6. The National Vaccine Injury Compensation Program    The [facility] Vaccine Injury Compensation Program (VICP) is a federal program that was created to compensate people who may have been injured by certain vaccines. Persons who believe they may have been injured by a vaccine can learn about the program and about filing a claim by calling 2-560.901.3594 or visiting the [employer] website at www.[facility].gov/vaccinecompensation. There is a time limit to file a claim for compensation. 7. How can I learn more?  Ask your healthcare provider. He or she can give you the vaccine package insert or suggest other sources of information.  Call your local or state health department.  Contact the Centers for Disease Control and Prevention (CDC):  - Call 5-360.769.3926 (1-800-CDC-INFO) or  - Visit CDCs website at www.cdc.gov/flu    Vaccine Information Statement   Inactivated Influenza Vaccine   8/7/2015  40 MAGDA [address] [id number]    Department of Health and Human Services  Centers for Disease Control and Prevention    Office Use Only        Please keep your follow-up appointment with Zainab Avalos MD.     Follow-up Disposition:  Return if symptoms worsen or fail to improve.     Health Maintenance Due   Topic Date Due    Influenza Age 5 to Adult  08/01/2018       I have discussed the diagnosis with the patient and the intended plan as seen in the above orders. Patient is in agreement. The patient has received an after-visit summary and questions were answered concerning future plans. I have discussed medication side effects and warnings with the patient as well. Warning signs for the above conditions were discussed including when to call our office or go to the emergency room. The nurse provided the patient and/or family with advanced directive information if needed and encouraged the patient to provide a copy to the office when available.

## 2019-02-21 NOTE — PATIENT INSTRUCTIONS
Keep f/u with Dr. Nayeli May. Drink plenty of fluids and stay hydrated. Get adequate rest.        Fatigue: Care Instructions  Your Care Instructions    Fatigue is a feeling of tiredness, exhaustion, or lack of energy. You may feel fatigue because of too much or not enough activity. It can also come from stress, lack of sleep, boredom, and poor diet. Many medical problems, such as viral infections, can cause fatigue. Emotional problems, especially depression, are often the cause of fatigue. Fatigue is most often a symptom of another problem. Treatment for fatigue depends on the cause. For example, if you have fatigue because you have a certain health problem, treating this problem also treats your fatigue. If depression or anxiety is the cause, treatment may help. Follow-up care is a key part of your treatment and safety. Be sure to make and go to all appointments, and call your doctor if you are having problems. It's also a good idea to know your test results and keep a list of the medicines you take. How can you care for yourself at home? · Get regular exercise. But don't overdo it. Go back and forth between rest and exercise. · Get plenty of rest.  · Eat a healthy diet. Do not skip meals, especially breakfast.  · Reduce your use of caffeine, tobacco, and alcohol. Caffeine is most often found in coffee, tea, cola drinks, and chocolate. · Limit medicines that can cause fatigue. This includes tranquilizers and cold and allergy medicines. When should you call for help? Watch closely for changes in your health, and be sure to contact your doctor if:    · You have new symptoms such as fever or a rash.     · Your fatigue gets worse.     · You have been feeling down, depressed, or hopeless. Or you may have lost interest in things that you usually enjoy.     · You are not getting better as expected. Where can you learn more? Go to http://beverly-gume.info/.   Enter S343 in the search box to learn more about \"Fatigue: Care Instructions. \"  Current as of: September 23, 2018  Content Version: 11.9  © 4553-0428 [employer]. Care instructions adapted under license by [employer] (which disclaims liability or warranty for this information). If you have questions about a medical condition or this instruction, always ask your healthcare professional. [address] any warranty or liability for your use of this information. Corns and Calluses: Care Instructions  Your Care Instructions  Corns and calluses are areas of thick, hard, dead skin. They form to protect your skin from injury. Corns usually form where toes rub together. Calluses often form on the hands or feet. They may form wherever the skin rubs against something, such as shoes. In most cases, you can take steps at home to care for a corn or callus. Follow-up care is a key part of your treatment and safety. Be sure to make and go to all appointments, and call your doctor if you are having problems. It's also a good idea to know your test results and keep a list of the medicines you take. How can you care for yourself at home? · Wear shoes and other footwear that fit correctly and are roomy. This will reduce rubbing and give corns or calluses time to heal.  · Use protective pads, such as moleskin, to cushion the callus or corn. · Soften the corn or callus and remove the dead skin by using over-the-counter products such as salicylic acid. If you have a condition that causes problems with blood flow (such as peripheral vascular disease) or loss of feeling in your feet (such as diabetes), talk to your doctor before you try any home treatment. · Soak your corn or callus in warm water, and then use a pumice stone to rub dead skin away. · Wash your feet regularly, and rub lotion into your feet while they are still moist. Dry skin can cause a callus to crack and bleed.   · Never cut the corn or callus yourself, especially if you have problems with blood flow to your legs or feet. When should you call for help? Call your doctor now or seek immediate medical care if:    · You have signs of infection, such as:  ? Increased pain, swelling, warmth, or redness around the corn or callus. ? Red streaks leading from the corn or callus. ? Pus draining from the corn or callus. ? A fever.    Watch closely for changes in your health, and be sure to contact your doctor if:    · You do not get better as expected. Where can you learn more? Go to http://beverlySolidariumgume.info/. Enter R244 in the search box to learn more about \"Corns and Calluses: Care Instructions. \"  Current as of: April 17, 2018  Content Version: 11.9  © 1856-8102 [employer]. Care instructions adapted under license by [employer] (which disclaims liability or warranty for this information). If you have questions about a medical condition or this instruction, always ask your healthcare professional. [place] [address] any warranty or liability for your use of this information. Vaccine Information Statement    Influenza (Flu) Vaccine (Inactivated or Recombinant): What you need to know    Many Vaccine Information Statements are available in [place]e and other languages. See www.immunize.org/vis  Hojas de Información Sobre Vacunas están disponibles en Español y en muchos otros idiomas. Visite www.immunize.org/vis    1. Why get vaccinated? Influenza (flu) is a contagious disease that spreads around the [place] every year, usually between October and May. Flu is caused by influenza viruses, and is spread mainly by coughing, sneezing, and close contact. Anyone can get flu. Flu strikes suddenly and can last several days.  Symptoms vary by age, but can include:   fever/chills   sore throat   muscle aches   fatigue   cough   headache    runny or stuffy nose    Flu can also lead to pneumonia and blood infections, and cause diarrhea and seizures in children. If you have a medical condition, such as heart or lung disease, flu can make it worse. Flu is more dangerous for some people. Infants and young children, people 72years of age and older, pregnant women, and people with certain health conditions or a weakened immune system are at greatest risk. Each year thousands of people in the [facility] die from flu, and many more are hospitalized. Flu vaccine can:   keep you from getting flu,   make flu less severe if you do get it, and   keep you from spreading flu to your family and other people. 2. Inactivated and recombinant flu vaccines    A dose of flu vaccine is recommended every flu season. Children 6 months through 6years of age may need two doses during the same flu season. Everyone else needs only one dose each flu season. Some inactivated flu vaccines contain a very small amount of a mercury-based preservative called thimerosal. Studies have not shown thimerosal in vaccines to be harmful, but flu vaccines that do not contain thimerosal are available. There is no live flu virus in flu shots. They cannot cause the flu. There are many flu viruses, and they are always changing. Each year a new flu vaccine is made to protect against three or four viruses that are likely to cause disease in the upcoming flu season. But even when the vaccine doesnt exactly match these viruses, it may still provide some protection    Flu vaccine cannot prevent:   flu that is caused by a virus not covered by the vaccine, or   illnesses that look like flu but are not. It takes about 2 weeks for protection to develop after vaccination, and protection lasts through the flu season. 3. Some people should not get this vaccine    Tell the person who is giving you the vaccine:     If you have any severe, life-threatening allergies.     If you ever had a life-threatening allergic reaction after a dose of flu vaccine, or have a severe allergy to any part of this vaccine, you may be advised not to get vaccinated. Most, but not all, types of flu vaccine contain a small amount of egg protein.  If you ever had Guillain-Barré Syndrome (also called GBS). Some people with a history of GBS should not get this vaccine. This should be discussed with your doctor.  If you are not feeling well. It is usually okay to get flu vaccine when you have a mild illness, but you might be asked to come back when you feel better. 4. Risks of a vaccine reaction    With any medicine, including vaccines, there is a chance of reactions. These are usually mild and go away on their own, but serious reactions are also possible. Most people who get a flu shot do not have any problems with it. Minor problems following a flu shot include:    soreness, redness, or swelling where the shot was given     hoarseness   sore, red or itchy eyes   cough   fever   aches   headache   itching   fatigue  If these problems occur, they usually begin soon after the shot and last 1 or 2 days. More serious problems following a flu shot can include the following:     There may be a small increased risk of Guillain-Barré Syndrome (GBS) after inactivated flu vaccine. This risk has been estimated at 1 or 2 additional cases per million people vaccinated. This is much lower than the risk of severe complications from flu, which can be prevented by flu vaccine.  Young children who get the flu shot along with pneumococcal vaccine (PCV13) and/or DTaP vaccine at the same time might be slightly more likely to have a seizure caused by fever. Ask your doctor for more information. Tell your doctor if a child who is getting flu vaccine has ever had a seizure. Problems that could happen after any injected vaccine:      People sometimes faint after a medical procedure, including vaccination. Sitting or lying down for about 15 minutes can help prevent fainting, and injuries caused by a fall. Tell your doctor if you feel dizzy, or have vision changes or ringing in the ears.  Some people get severe pain in the shoulder and have difficulty moving the arm where a shot was given. This happens very rarely.  Any medication can cause a severe allergic reaction. Such reactions from a vaccine are very rare, estimated at about 1 in a million doses, and would happen within a few minutes to a few hours after the vaccination. As with any medicine, there is a very remote chance of a vaccine causing a serious injury or death. The safety of vaccines is always being monitored. For more information, visit: www.cdc.gov/vaccinesafety/    5. What if there is a serious reaction? What should I look for?  Look for anything that concerns you, such as signs of a severe allergic reaction, very high fever, or unusual behavior. Signs of a severe allergic reaction can include hives, swelling of the face and throat, difficulty breathing, a fast heartbeat, dizziness, and weakness - usually within a few minutes to a few hours after the vaccination. What should I do?  If you think it is a severe allergic reaction or other emergency that cant wait, call 9-1-1 and get the person to the nearest hospital. Otherwise, call your doctor.  Reactions should be reported to the Vaccine Adverse Event Reporting System (VAERS). Your doctor should file this report, or you can do it yourself through  the VAERS web site at www.vaers. hhs.gov, or by calling 8-766.214.7841. VAERS does not give medical advice. 6. The National Vaccine Injury Compensation Program    The [facility] Vaccine Injury Compensation Program (VICP) is a federal program that was created to compensate people who may have been injured by certain vaccines.     Persons who believe they may have been injured by a vaccine can learn about the program and about filing a claim by calling 9-301.940.9947 or visiting the [employer] website at www.[facility].gov/vaccinecompensation. There is a time limit to file a claim for compensation. 7. How can I learn more?  Ask your healthcare provider. He or she can give you the vaccine package insert or suggest other sources of information.  Call your local or state health department.  Contact the Centers for Disease Control and Prevention (CDC):  - Call 5-897.234.9414 (1-800-CDC-INFO) or  - Visit CDCs website at www.cdc.gov/flu    Vaccine Information Statement   Inactivated Influenza Vaccine   8/7/2015  [id number]    Department of Health and Human Services  Centers for Disease Control and Prevention    Office Use Only

## 2019-02-21 NOTE — PROGRESS NOTES
Chief Complaint   Patient presents with    Fatigue     Patient is here with c/o fatigue. 1. Have you been to the ER, urgent care clinic since your last visit? Hospitalized since your last visit? No    2. Have you seen or consulted any other health care providers outside of the [address] [place] [address] since your last visit? Include any pap smears or colon screening.  No  Health Maintenance Due   Topic Date Due    Influenza Age 5 to Adult  08/01/2018     Patient refused Influenza

## 2019-02-22 LAB
ALBUMIN SERPL-MCNC: 4.1 G/DL (ref 3.5–5.5)
ALBUMIN/GLOB SERPL: 1.4 {RATIO} (ref 1.2–2.2)
ALP SERPL-CCNC: 69 IU/L (ref 39–117)
ALT SERPL-CCNC: 26 IU/L (ref 0–44)
AST SERPL-CCNC: 21 IU/L (ref 0–40)
BASOPHILS # BLD AUTO: 0 X10E3/UL (ref 0–0.2)
BASOPHILS NFR BLD AUTO: 0 %
BILIRUB SERPL-MCNC: 0.4 MG/DL (ref 0–1.2)
BUN SERPL-MCNC: 11 MG/DL (ref 6–24)
BUN/CREAT SERPL: 14 (ref 9–20)
CALCIUM SERPL-MCNC: 8.8 MG/DL (ref 8.7–10.2)
CHLORIDE SERPL-SCNC: 101 MMOL/L (ref 96–106)
CO2 SERPL-SCNC: 27 MMOL/L (ref 20–29)
CREAT SERPL-MCNC: 0.77 MG/DL (ref 0.76–1.27)
EOSINOPHIL # BLD AUTO: 0.1 X10E3/UL (ref 0–0.4)
EOSINOPHIL NFR BLD AUTO: 1 %
ERYTHROCYTE [DISTWIDTH] IN BLOOD BY AUTOMATED COUNT: 13.3 % (ref 12.3–15.4)
GLOBULIN SER CALC-MCNC: 2.9 G/DL (ref 1.5–4.5)
GLUCOSE SERPL-MCNC: 94 MG/DL (ref 65–99)
HCT VFR BLD AUTO: 42.7 % (ref 37.5–51)
HGB BLD-MCNC: 14.6 G/DL (ref 13–17.7)
IMM GRANULOCYTES # BLD AUTO: 0 X10E3/UL (ref 0–0.1)
IMM GRANULOCYTES NFR BLD AUTO: 0 %
LYMPHOCYTES # BLD AUTO: 1.4 X10E3/UL (ref 0.7–3.1)
LYMPHOCYTES NFR BLD AUTO: 22 %
MCH RBC QN AUTO: 30.9 PG (ref 26.6–33)
MCHC RBC AUTO-ENTMCNC: 34.2 G/DL (ref 31.5–35.7)
MCV RBC AUTO: 90 FL (ref 79–97)
MONOCYTES # BLD AUTO: 0.4 X10E3/UL (ref 0.1–0.9)
MONOCYTES NFR BLD AUTO: 6 %
NEUTROPHILS # BLD AUTO: 4.4 X10E3/UL (ref 1.4–7)
NEUTROPHILS NFR BLD AUTO: 71 %
PLATELET # BLD AUTO: 211 X10E3/UL (ref 150–379)
POTASSIUM SERPL-SCNC: 3.7 MMOL/L (ref 3.5–5.2)
PROT SERPL-MCNC: 7 G/DL (ref 6–8.5)
RBC # BLD AUTO: 4.73 X10E6/UL (ref 4.14–5.8)
SODIUM SERPL-SCNC: 142 MMOL/L (ref 134–144)
TSH SERPL DL<=0.005 MIU/L-ACNC: 1.52 UIU/ML (ref 0.45–4.5)
WBC # BLD AUTO: 6.3 X10E3/UL (ref 3.4–10.8)

## 2019-02-22 NOTE — PROGRESS NOTES
Patient verified his name and . Patient notified of lab results per Shalom Fonseca NP. Patient verbalized understanding.

## 2019-02-22 NOTE — PROGRESS NOTES
Pls let pt know his blood work looks good!  Blood counts, blood sugar, kidney and liver function, and thyroid levels are normal.

## 2019-02-28 ENCOUNTER — TELEPHONE (OUTPATIENT)
Dept: NEUROLOGY | Age: 44
End: 2019-02-28

## 2019-02-28 NOTE — TELEPHONE ENCOUNTER
Patient re-scheduled for March 14 at 2:40pm. Instructed to arrive 15 minutes early for the check-in process.

## 2019-03-14 ENCOUNTER — OFFICE VISIT (OUTPATIENT)
Dept: NEUROLOGY | Age: 44
End: 2019-03-14

## 2019-03-14 VITALS
HEIGHT: 71 IN | DIASTOLIC BLOOD PRESSURE: 82 MMHG | RESPIRATION RATE: 20 BRPM | TEMPERATURE: 98.1 F | BODY MASS INDEX: 39.76 KG/M2 | HEART RATE: 64 BPM | OXYGEN SATURATION: 98 % | SYSTOLIC BLOOD PRESSURE: 124 MMHG | WEIGHT: 284 LBS

## 2019-03-14 DIAGNOSIS — G37.9 CLINICALLY ISOLATED SYNDROME (HCC): Primary | ICD-10-CM

## 2019-03-14 DIAGNOSIS — H49.02 PARTIAL LEFT THIRD NERVE PALSY: ICD-10-CM

## 2019-03-14 NOTE — PATIENT INSTRUCTIONS
Please consider following up at our office location that is closest for you for your continued neurological care: At [facility] (phone number: [phone]) Dr. Clive Mendez recommends Dr. Rolando Easton or Dr. Babita Dsouza      Thank you! [address] [facility]   Statement to Patients  April 1, 2014      In an effort to ensure the large volume of patient prescription refills is processed in the most efficient and expeditious manner, we are asking our patients to assist us by calling your Pharmacy for all prescription refills, this will include also your  Mail Order Pharmacy. The pharmacy will contact our office electronically to continue the refill process. Please do not wait until the last minute to call your pharmacy. We need at least 48 hours (2days) to fill prescriptions. We also encourage you to call your pharmacy before going to  your prescription to make sure it is ready. With regard to controlled substance prescription refill requests (narcotic refills) that need to be picked up at our office, we ask your cooperation by providing us with at least 72 hours (3days) notice that you will need a refill. We will not refill narcotic prescription refill requests after 4:00pm on any weekday, Monday through Thursday, or after 2:00pm on Fridays, or on the weekends. We encourage everyone to explore another way of getting your prescription refill request processed using [facility], our patient web portal through our electronic medical record system. [facility] is an efficient and effective way to communicate your medication request directly to the office and  downloadable as an meron on your smart phone . [facility] also features a review functionality that allows you to view your medication list as well as leave messages for your physician. Are you ready to get connected?  If so please review the attatched instructions or speak to any of our staff to get you set up right away!    Thank you so much for your cooperation. Should you have any questions please contact our Practice Administrator.     The Physicians and Staff,  [facility]

## 2019-03-14 NOTE — PROGRESS NOTES
Chief Complaint   Patient presents with    Multiple Sclerosis     PATIENT STATES ALL IS DOING WELL     Visit Vitals  /82 (BP 1 Location: Right arm, BP Patient Position: Sitting)   Pulse 64   Temp 98.1 °F (36.7 °C) (Oral)   Resp 20   Ht 5' 11\" (1.803 m)   Wt 128.8 kg (284 lb)   SpO2 98%   BMI 39.61 kg/m²     1. Have you been to the ER, urgent care clinic since your last visit? Hospitalized since your last visit? No    2. Have you seen or consulted any other health care providers outside of the [address] [place] [address] since your last visit? Include any pap smears or colon screening.  No

## 2019-03-14 NOTE — PROGRESS NOTES
Neurology Progress Note    Patient ID:  Deep West  [id number]  37 y.o.  1975    HISTORY PROVIDED BY:  Patient    Chief Complaint: CIS  Subjective:      Mr. 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 He is doing very well on Avonex. No events or flares since last visit. No side effects from Avonex. His mood is good. Patient is currently on the Avonex drug-free program.  This is working well for him. His authorization for the medication is from October to October. He does not currently need any refills. Patient does follow with ophthalmology but is due for a follow-up there. Patient denies any cognitive issues or fatigue. Patient's last MRI was negative for new lesions. We will plan to recheck at the two-year maame. Recap:  He has not had any flares or progression to MS at this point. Vision is good. No diplopia. MRI brain was rechecked in June and this was negative for any new lesions. We will wait for 2 years now to recheck again. Avoenx is his DMT. He takes it weekly. No flu like issues if he is hydrated enough. He is on the free drug program and this is working well for him. No fatigue or issues with concentration. He has no bowel or bladder dysfunction. Objective:   ROS:  Per HPI-  Otherwise 12 point ROS was negative    Meds:  Current Outpatient Medications on File Prior to Visit   Medication Sig Dispense Refill    atorvastatin (LIPITOR) 40 mg tablet take 1 tablet by mouth once daily 90 Tab 1    ibuprofen (MOTRIN) 800 mg tablet Take 1 Tab by mouth every eight (8) hours as needed for Pain. 30 Tab 0    interferon beta-1a (AVONEX) 30 mcg/0.5 mL injection Inject 30 mcg intramuscularly once a week as directed 4 Kit 6     No current facility-administered medications on file prior to visit. Imaging:  MRI brain (June/18):  No new lesions (I personally reviewed these images in PACS and this is my impression)    MRI brain in May showed one new left frontal lesion with no enhancement otherwise unchanged (I personally reviewed these images in PACS and this is my impression)  MRI C spine: normal    Reviewed records in [place] and media tab today- ophtho appt reviewed and pt had normal exam    Lab Review   Results for orders placed or performed in visit on 02/21/19   CBC WITH AUTOMATED DIFF   Result Value Ref Range    WBC 6.3 3.4 - 10.8 x10E3/uL    RBC 4.73 4.14 - 5.80 x10E6/uL    HGB 14.6 13.0 - 17.7 g/dL    HCT 42.7 37.5 - 51.0 %    MCV 90 79 - 97 fL    MCH 30.9 26.6 - 33.0 pg    MCHC 34.2 31.5 - 35.7 g/dL    RDW 13.3 12.3 - 15.4 %    PLATELET 463 874 - 236 x10E3/uL    NEUTROPHILS 71 Not Estab. %    Lymphocytes 22 Not Estab. %    MONOCYTES 6 Not Estab. %    EOSINOPHILS 1 Not Estab. %    BASOPHILS 0 Not Estab. %    ABS. NEUTROPHILS 4.4 1.4 - 7.0 x10E3/uL    Abs Lymphocytes 1.4 0.7 - 3.1 x10E3/uL    ABS. MONOCYTES 0.4 0.1 - 0.9 x10E3/uL    ABS. EOSINOPHILS 0.1 0.0 - 0.4 x10E3/uL    ABS. BASOPHILS 0.0 0.0 - 0.2 x10E3/uL    IMMATURE GRANULOCYTES 0 Not Estab. %    ABS. IMM. GRANS. 0.0 0.0 - 0.1 X65Y8/VE   METABOLIC PANEL, COMPREHENSIVE   Result Value Ref Range    Glucose 94 65 - 99 mg/dL    BUN 11 6 - 24 mg/dL    Creatinine 0.77 0.76 - 1.27 mg/dL    GFR est non- >59 mL/min/1.73    GFR est  >59 mL/min/1.73    BUN/Creatinine ratio 14 9 - 20    Sodium 142 134 - 144 mmol/L    Potassium 3.7 3.5 - 5.2 mmol/L    Chloride 101 96 - 106 mmol/L    CO2 27 20 - 29 mmol/L    Calcium 8.8 8.7 - 10.2 mg/dL    Protein, total 7.0 6.0 - 8.5 g/dL    Albumin 4.1 3.5 - 5.5 g/dL    GLOBULIN, TOTAL 2.9 1.5 - 4.5 g/dL    A-G Ratio 1.4 1.2 - 2.2    Bilirubin, total 0.4 0.0 - 1.2 mg/dL    Alk.  phosphatase 69 39 - 117 IU/L    AST (SGOT) 21 0 - 40 IU/L    ALT (SGPT) 26 0 - 44 IU/L   TSH RFX ON ABNORMAL TO FREE T4   Result Value Ref Range    TSH 1.520 0.450 - 4.500 uIU/mL       Exam:  Visit Vitals  /82 (BP 1 Location: Right arm, BP Patient Position: Sitting)   Pulse 64   Temp 98.1 °F (36.7 °C) (Oral)   Resp 20   Ht 5' 11\" (1.803 m)   Wt 284 lb (128.8 kg)   SpO2 98%   BMI 39.61 kg/m²     Gen: Well developed  CV: RRR  Lungs: non labored breathing  Abd: non distending  Neuro: A&O x 3, no dysarthria or aphasia  CN II-XII: PERRL, EOMI, face symmetric, tongue/palate midline  Motor: strength 5/5 all four ext  Sensory: intact to LT  Gait: normal    Assessment:     Irish Waddell is  a 38 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 Currently he is on Avonex and doing well. Monitoring labs have been stable. He recently had this done with his PCP and they were negative. Plan for MRI will be to repeat in June 2020. We will continue Avonex. Plan:     1. Continue on Avonex. Patient is on a free drug program.  No refill needed today  2. Continue to pre-medicate and hydrate to avoid side effects if needed  3. Encouraged to stay physically active. Discussed weight loss  4. MRI brain negative. Plan will be repeat in 2 years unless he has flares. This would be June 2020  5. Monitoring labs stable and recently check with PCP. No reason to repeat at this time  6. Cont with ophthalmology      FU in 6 months to establish care and do additional authorization for Avonex if needed    Elisha Ogden MD  3/14/2019  4:09 PM      Medications and side effects discussed with patient in detail. With any new medications prescribed, patient was given instructions on administration and side effects. Written medication information was provided to the patient as well. This note was created using voice recognition software. Despite editing, there may be syntax errors. This note will not be viewable in [address]

## 2019-08-06 NOTE — TELEPHONE ENCOUNTER
Received a fax request for this refill. Future Appointments   Date Time Provider Vicente Sosa   9/23/2019  3:00 PM Arslan Johnston  [facility]                         Last Appointment My Department:  Visit date not found    Would you like to refill below? Requested Prescriptions     Pending Prescriptions Disp Refills    interferon beta-1a (AVONEX) 30 mcg/0.5 mL injection 4 Kit 6     Sig: Inject 30 mcg intramuscularly once a week as directed   .

## 2019-08-07 RX ORDER — INTERFERON BETA-1A 30 UG/.5ML
INJECTION, SOLUTION INTRAMUSCULAR
Qty: 4 KIT | Refills: 6
Start: 2019-08-07 | End: 2019-08-13 | Stop reason: SDUPTHER

## 2019-08-12 ENCOUNTER — TELEPHONE (OUTPATIENT)
Dept: NEUROLOGY | Age: 44
End: 2019-08-12

## 2019-08-12 NOTE — TELEPHONE ENCOUNTER
----- Message from Erika Diaz sent at 8/12/2019  3:44 PM EDT -----  Regarding: Dr. Porsche Billingsley first and last name: Hermila Romerois      Reason for call: Advising pharmacy has not received approval for Avonex pen,  pt would like  Prescription to be sent to [facility] [phone], pt is currently out of medication.       Callback required yes/no and why: No      Best contact number(s): [phone]      Details to clarify the request:      Erika Diaz

## 2019-08-12 NOTE — TELEPHONE ENCOUNTER
----- Message from Aye Greene sent at 2019  4:01 PM EDT -----  Regarding: Dr Cayla Franco from [facility] [phone] Option #2, checking on the status from message left on Monday speaking with Ladonna Shoemaker and was told to fax the request for Avonex 30 mcg pen to the office and would like to know the status they send it over on 19

## 2019-08-12 NOTE — TELEPHONE ENCOUNTER
Received call from [facility], they are requesting a verbal script for the pt's Avonex. He is out of medication.       [phone], option 2

## 2019-08-13 RX ORDER — INTERFERON BETA-1A 30 UG/.5ML
INJECTION, SOLUTION INTRAMUSCULAR
Qty: 4 KIT | Refills: 6 | OUTPATIENT
Start: 2019-08-13 | End: 2022-10-25 | Stop reason: ALTCHOICE

## 2019-08-13 NOTE — TELEPHONE ENCOUNTER
[facility]ed [facility] and called in the script for Avonex; advised the script was refilled however patient indicated it was the wrong pharmacy. Called in the script for the Avonex and spoke with Laura Garber

## 2019-09-23 ENCOUNTER — OFFICE VISIT (OUTPATIENT)
Dept: NEUROLOGY | Age: 44
End: 2019-09-23

## 2019-09-23 VITALS
DIASTOLIC BLOOD PRESSURE: 80 MMHG | OXYGEN SATURATION: 96 % | BODY MASS INDEX: 40.6 KG/M2 | HEIGHT: 71 IN | HEART RATE: 113 BPM | SYSTOLIC BLOOD PRESSURE: 132 MMHG | WEIGHT: 290 LBS

## 2019-09-23 DIAGNOSIS — G35 MULTIPLE SCLEROSIS (HCC): Primary | ICD-10-CM

## 2019-09-23 PROBLEM — E66.01 OBESITY, MORBID (HCC): Status: ACTIVE | Noted: 2019-09-23

## 2019-09-23 NOTE — PATIENT INSTRUCTIONS
A Healthy Lifestyle: Care Instructions  Your Care Instructions    A healthy lifestyle can help you feel good, stay at a healthy weight, and have plenty of energy for both work and play. A healthy lifestyle is something you can share with your whole family. A healthy lifestyle also can lower your risk for serious health problems, such as high blood pressure, heart disease, and diabetes. You can follow a few steps listed below to improve your health and the health of your family. Follow-up care is a key part of your treatment and safety. Be sure to make and go to all appointments, and call your doctor if you are having problems. It's also a good idea to know your test results and keep a list of the medicines you take. How can you care for yourself at home? · Do not eat too much sugar, fat, or fast foods. You can still have dessert and treats now and then. The goal is moderation. · Start small to improve your eating habits. Pay attention to portion sizes, drink less juice and soda pop, and eat more fruits and vegetables. ? Eat a healthy amount of food. A 3-ounce serving of meat, for example, is about the size of a deck of cards. Fill the rest of your plate with vegetables and whole grains. ? Limit the amount of soda and sports drinks you have every day. Drink more water when you are thirsty. ? Eat at least 5 servings of fruits and vegetables every day. It may seem like a lot, but it is not hard to reach this goal. A serving or helping is 1 piece of fruit, 1 cup of vegetables, or 2 cups of leafy, raw vegetables. Have an apple or some carrot sticks as an afternoon snack instead of a candy bar. Try to have fruits and/or vegetables at every meal.  · Make exercise part of your daily routine. You may want to start with simple activities, such as walking, bicycling, or slow swimming. Try to be active 30 to 60 minutes every day. You do not need to do all 30 to 60 minutes all at once.  For example, you can exercise 3 times a day for 10 or 20 minutes. Moderate exercise is safe for most people, but it is always a good idea to talk to your doctor before starting an exercise program.  · Keep moving. Jennifer Farmer the lawn, work in the garden, or [employer]. Take the stairs instead of the elevator at work. · If you smoke, quit. People who smoke have an increased risk for heart attack, stroke, cancer, and other lung illnesses. Quitting is hard, but there are ways to boost your chance of quitting tobacco for good. ? Use nicotine gum, patches, or lozenges. ? Ask your doctor about stop-smoking programs and medicines. ? Keep trying. In addition to reducing your risk of diseases in the future, you will notice some benefits soon after you stop using tobacco. If you have shortness of breath or asthma symptoms, they will likely get better within a few weeks after you quit. · Limit how much alcohol you drink. Moderate amounts of alcohol (up to 2 drinks a day for men, 1 drink a day for women) are okay. But drinking too much can lead to liver problems, high blood pressure, and other health problems. Family health  If you have a family, there are many things you can do together to improve your health. · Eat meals together as a family as often as possible. · Eat healthy foods. This includes fruits, vegetables, lean meats and dairy, and whole grains. · Include your family in your fitness plan. Most people think of activities such as jogging or tennis as the way to fitness, but there are many ways you and your family can be more active. Anything that makes you breathe hard and gets your heart pumping is exercise. Here are some tips:  ? Walk to do errands or to take your child to school or the bus.  ? Go for a family bike ride after dinner instead of watching TV. Where can you learn more? Go to http://beverly-gume.info/. Enter G168 in the search box to learn more about \"A Healthy Lifestyle: Care Instructions. \"  Current as of: May 28, 2019  Content Version: 12.2  © 4569-0533 [employer]. Care instructions adapted under license by [facility] (which disclaims liability or warranty for this information). If you have questions about a medical condition or this instruction, always ask your healthcare professional. [address] any warranty or liability for your use of this information.        `

## 2019-09-23 NOTE — PROGRESS NOTES
Name: Mendel Greek IV    Chief Complaint: Multiple sclerosis  Is a 40-year-old right-handed male who was a former patient of Dr. Sean Gonzalez. Skylar Onofre returns for a follow up. He has been on avonex for CIS apparently had an KINA about three years ago. He had MRI evidence of several demyelinating lesions. Has 7 bands in the CSF. He has no new weakness or sensory loss    Assesment and Plan  1. Multipl sclerosis  continue avonex  Condition discussed in detail  Plan discussed in detail  Medical records reviewed  With her results and imaging reviewed with patient. 2.  Hyperlipoproteinemia  Continue atorvastatin    Allergies  Patient has no known allergies. Medications  Current Outpatient Medications   Medication Sig    interferon beta-1a (AVONEX) 30 mcg/0.5 mL injection Inject 30 mcg intramuscularly once a week as directed    atorvastatin (LIPITOR) 40 mg tablet take 1 tablet by mouth once daily    ibuprofen (MOTRIN) 800 mg tablet Take 1 Tab by mouth every eight (8) hours as needed for Pain. No current facility-administered medications for this visit. Medical History  Past Medical History:   Diagnosis Date    Clinically isolated syndrome ([facility] [address] 6/30/2015    GERD (gastroesophageal reflux disease)     Muscle weakness     Vertigo      Review of Systems   Eyes: Negative for blurred vision and double vision. Respiratory: Negative for cough and shortness of breath. Cardiovascular: Negative for chest pain, palpitations and orthopnea. Gastrointestinal: Negative for nausea and vomiting. Neurological: Negative for dizziness and headaches. Psychiatric/Behavioral: Negative for depression. The patient is not nervous/anxious. Exam:  Visit Vitals  /80   Pulse (!) 113   Ht 5' 11\" (1.803 m)   Wt 290 lb (131.5 kg)   SpO2 96%   BMI 40.45 kg/m²      General: Well developed, well nourished.  Patient in no apparent distress   Head: Normocephalic, atraumatic, anicteric sclera   Neck Normal ROM, No thyromegally   Lungs:  Clear to auscultation bilaterally, No wheezes or rubs   Cardiac: Regular rate and rhythm with no murmurs. Abd: Bowel sounds were audible. No tenderness on palpation   Ext: No pedal edema   Skin: Supple no rash     NeurologicExam:  Mental Status: Alert and oriented to person place and time   Speech: Fluent no aphasia or dysarthria. Cranial Nerves:  II - XII Intact   Motor:  Full and symmetric strength of upper and lower proximal and distal muscles. Normal bulk and tone. Reflexes:   Deep tendon reflexes 2+/4 and symmetric. Sensory:   Symmetric and intact with no perceived deficits modalities involving small or large fibers. Gait:  Gait is balanced and fluid with normal arm swing. Tremor:   No tremor noted. Cerebellar:  Coordination intact. Neurovascular: No carotid bruits. No JVD               Imaging  CT Results (most recent):  Results from Hospital Encounter encounter on 06/21/15   CT HEAD WO CONT    Narrative **Final Report**       ICD Codes / Adm. Diagnosis: 627758  178143 / Dizziness  Double Vision  Examination:  CT HEAD WO CON  - [id number] - Jun 21 2015  3:18PM  Accession No:  [id number]  Reason:  Pain      REPORT:  EXAM:  CT HEAD WO CON    INDICATION:   Pain    COMPARISON: None. TECHNIQUE: Unenhanced CT of the head was performed using 5 mm images. Brain   and bone windows were generated. FINDINGS:  The ventricles and sulci are normal in size, shape and configuration and   midline. There is no significant white matter disease. There is no   intracranial hemorrhage, extra-axial collection, mass, mass effect or   midline shift. The basilar cisterns are open. No acute infarct is   identified. The bone windows demonstrate no abnormalities. The visualized   portions of the paranasal sinuses and mastoid air cells are clear.         IMPRESSION: Normal study              Signing/Reading Doctor: Jace Peña ([id number])    Approved: Jace Peña ([id number])  Jun 21 2015  3:24PM MRI Results (most recent):  Results from [place] encounter on 06/14/18   MRI BRAIN W WO CONT    Narrative Clinical indication: Migraines. Technical factors. Diffusion imaging, sagittal T1-weighted pre and post 20 cc  IV. Time axial T1-weighted pre and postcontrast T2 weighted FLAIR gradient echo  coronal T1-weighted postcontrast, comparison May 16, 2017. Diffusion imaging does not show any acute findings. The burden of white matter disease is stable. Ventricle size is also unchanged. There is no enhancing lesion or masses. There is no extra-axial fluid collection  hemorrhage or shift. Impression  impression: Stable burden of white matter disease.          Lab Review  Lab Results   Component Value Date/Time    WBC 6.3 02/21/2019 03:04 PM    HCT 42.7 02/21/2019 03:04 PM    HGB 14.6 02/21/2019 03:04 PM    PLATELET 865 34/79/6297 03:04 PM       Lab Results   Component Value Date/Time    Sodium 142 02/21/2019 03:04 PM    Potassium 3.7 02/21/2019 03:04 PM    Chloride 101 02/21/2019 03:04 PM    CO2 27 02/21/2019 03:04 PM    Glucose 94 02/21/2019 03:04 PM    BUN 11 02/21/2019 03:04 PM    Creatinine 0.77 02/21/2019 03:04 PM    Calcium 8.8 02/21/2019 03:04 PM           Lab Results   Component Value Date/Time    Hemoglobin A1c 5.7 06/21/2015 04:29 PM        Lab Results   Component Value Date/Time    Vitamin B12 332 06/22/2015 02:33 PM           Lab Results   Component Value Date/Time    Cholesterol, total 154 11/22/2016 12:00 AM    HDL Cholesterol 50 11/22/2016 12:00 AM    LDL, calculated 91 11/22/2016 12:00 AM    VLDL, calculated 13 11/22/2016 12:00 AM    Triglyceride 64 11/22/2016 12:00 AM    CHOL/HDL Ratio 4.5 06/21/2015 04:29 PM          45 minutes spent more than 50% spent in chart review and face to face  examination, discussion of treatment options and approach

## 2019-10-07 ENCOUNTER — OFFICE VISIT (OUTPATIENT)
Dept: FAMILY MEDICINE CLINIC | Age: 44
End: 2019-10-07

## 2019-10-07 VITALS
SYSTOLIC BLOOD PRESSURE: 112 MMHG | WEIGHT: 292 LBS | HEART RATE: 85 BPM | DIASTOLIC BLOOD PRESSURE: 70 MMHG | TEMPERATURE: 98.8 F | OXYGEN SATURATION: 95 % | RESPIRATION RATE: 18 BRPM | HEIGHT: 71 IN | BODY MASS INDEX: 40.88 KG/M2

## 2019-10-07 DIAGNOSIS — J20.9 ACUTE BRONCHITIS, UNSPECIFIED ORGANISM: Primary | ICD-10-CM

## 2019-10-07 RX ORDER — AZITHROMYCIN 250 MG/1
TABLET, FILM COATED ORAL
Qty: 6 TAB | Refills: 0 | Status: SHIPPED | OUTPATIENT
Start: 2019-10-07 | End: 2019-10-12

## 2019-10-07 RX ORDER — BENZONATATE 200 MG/1
200 CAPSULE ORAL
Qty: 30 CAP | Refills: 0 | Status: SHIPPED | OUTPATIENT
Start: 2019-10-07 | End: 2019-10-14

## 2019-10-07 NOTE — PROGRESS NOTES
Chief Complaint   Patient presents with    Cold Symptoms     coughing frequently     1. Have you been to the ER, urgent care clinic since your last visit? Hospitalized since your last visit? No    2. Have you seen or consulted any other health care providers outside of the [address] [place] [address] since your last visit? Include any pap smears or colon screening.  No

## 2019-10-07 NOTE — PROGRESS NOTES
Chief Complaint   Patient presents with    Cold Symptoms     coughing frequently       Subjective:   Zeynep Becker is a 53 y.o. male who complains of congestion and productive cough for 10 days, gradually worsening since that time. He denies a history of shortness of breath and wheezing. Evaluation to date: none. Treatment to date: OTC products. Patient does not smoke cigarettes. Relevant PMH: No pertinent additional PMH. Patient Active Problem List   Diagnosis Code    Obesity (BMI 30-39. 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 30 Cap 0    interferon beta-1a (AVONEX) 30 mcg/0.5 mL injection Inject 30 mcg intramuscularly once a week as directed 4 Kit 6    atorvastatin (LIPITOR) 40 mg tablet take 1 tablet by mouth once daily 90 Tab 1    ibuprofen (MOTRIN) 800 mg tablet Take 1 Tab by mouth every eight (8) hours as needed for Pain. 30 Tab 0     No Known Allergies     Review of Systems  Pertinent items are noted in HPI. Objective:     Visit Vitals  /70 (BP 1 Location: Left arm, BP Patient Position: Sitting)   Pulse 85   Temp 98.8 °F (37.1 °C) (Oral)   Resp 18   Ht 5' 11\" (1.803 m)   Wt 292 lb (132.5 kg)   SpO2 95%   BMI 40.73 kg/m²     General:  alert, cooperative, no distress   Eyes: conjunctivae/corneas clear. PERRL, EOM's intact.  Fundi benign   Ears: normal TM's and external ear canals AU   Sinuses: tenderness over generalized maxillary   Mouth:  Lips, mucosa, and tongue normal. Teeth and gums normal   Neck: supple, symmetrical, trachea midline and no adenopathy. Heart: S1 and S2 normal, no murmurs noted. Lungs: clear to auscultation bilaterally        Assessment/Plan:   sinusitis  Suggested symptomatic OTC remedies. Antibiotics per orders. RTC prn. ICD-10-CM ICD-9-CM    1. Acute bronchitis, unspecified organism J20.9 466.0      Encounter Diagnoses   Name Primary?  Acute bronchitis, unspecified organism Yes     Orders Placed This Encounter    azithromycin (ZITHROMAX) 250 mg tablet    benzonatate (TESSALON) 200 mg capsule   .

## 2019-10-28 RX ORDER — ATORVASTATIN CALCIUM 40 MG/1
TABLET, FILM COATED ORAL
Qty: 90 TAB | Refills: 3 | Status: SHIPPED | OUTPATIENT
Start: 2019-10-28 | End: 2021-01-22 | Stop reason: SDUPTHER

## 2019-11-20 ENCOUNTER — OFFICE VISIT (OUTPATIENT)
Dept: FAMILY MEDICINE CLINIC | Age: 44
End: 2019-11-20

## 2019-11-20 VITALS
WEIGHT: 292 LBS | HEIGHT: 71 IN | TEMPERATURE: 98.3 F | BODY MASS INDEX: 40.88 KG/M2 | OXYGEN SATURATION: 93 % | RESPIRATION RATE: 16 BRPM | HEART RATE: 73 BPM | DIASTOLIC BLOOD PRESSURE: 76 MMHG | SYSTOLIC BLOOD PRESSURE: 120 MMHG

## 2019-11-20 DIAGNOSIS — R05.8 RECURRENT COUGH: Primary | ICD-10-CM

## 2019-11-20 RX ORDER — DOXYCYCLINE 100 MG/1
100 CAPSULE ORAL 2 TIMES DAILY
Qty: 20 CAP | Refills: 0 | Status: SHIPPED | OUTPATIENT
Start: 2019-11-20 | End: 2019-11-30

## 2019-11-20 RX ORDER — PREDNISONE 10 MG/1
TABLET ORAL
Qty: 21 TAB | Refills: 0 | Status: SHIPPED | OUTPATIENT
Start: 2019-11-20 | End: 2019-12-26 | Stop reason: ALTCHOICE

## 2019-11-20 NOTE — PROGRESS NOTES
Chief Complaint   Patient presents with    Cough     Pt was seen in early October for bronchitis, is still having cough, was better in between then and now but not completely. Pt reports that cough is the primary symptom. Subjective: (As above and below)     Chief Complaint   Patient presents with    Cough     he is a 53y.o. year old male who presents for evaluation. Reviewed PmHx, RxHx, FmHx, SocHx, AllgHx and updated in chart. Review of Systems - negative except as listed above    Objective:     Vitals:    11/20/19 0933   BP: 120/76   Pulse: 73   Resp: 16   Temp: 98.3 °F (36.8 °C)   TempSrc: Oral   SpO2: 93%   Weight: 292 lb (132.5 kg)   Height: 5' 11\" (1.803 m)     Physical Examination: General appearance - alert, well appearing, and in no distress  Mental status - normal mood, behavior, speech, dress, motor activity, and thought processes  Ears - bilateral TM's and external ear canals normal  Nose - normal and patent, no erythema, discharge or polyps  Mouth - mucous membranes moist, pharynx normal without lesions  Chest - clear to auscultation, no wheezes, rales or rhonchi, symmetric air entry, tight dry couigh  Heart - normal rate, regular rhythm, normal S1, S2, no murmurs, rubs, clicks or gallops  Musculoskeletal - no joint tenderness, deformity or swelling    Assessment/ Plan:   1. Recurrent cough  -take medication as written  - doxycycline (VIBRAMYCIN) 100 mg capsule; Take 1 Cap by mouth two (2) times a day for 10 days. Dispense: 20 Cap; Refill: 0  - predniSONE (STERAPRED DS) 10 mg dose pack; See administration instruction per 10mg dose pack  Dispense: 21 Tab; Refill: 0     Follow up as needed    I have discussed the diagnosis with the patient and the intended plan as seen in the above orders. The patient has received an after-visit summary and questions were answered concerning future plans.      Medication Side Effects and Warnings were discussed with patient: yes  Patient Labs were reviewed: yes  Patient Past Records were reviewed:  yes    Mery Dodd M.D.

## 2019-11-20 NOTE — PROGRESS NOTES
Chief Complaint   Patient presents with    Cough     1. Have you been to the ER, urgent care clinic since your last visit? Hospitalized since your last visit? No    2. Have you seen or consulted any other health care providers outside of the [address] [place] [address] since your last visit? Include any pap smears or colon screening.  No    Health Maintenance Due   Topic Date Due    Influenza Age 5 to Adult  08/01/2019

## 2019-12-26 ENCOUNTER — OFFICE VISIT (OUTPATIENT)
Dept: FAMILY MEDICINE CLINIC | Age: 44
End: 2019-12-26

## 2019-12-26 VITALS
SYSTOLIC BLOOD PRESSURE: 124 MMHG | DIASTOLIC BLOOD PRESSURE: 86 MMHG | TEMPERATURE: 98 F | HEIGHT: 71 IN | HEART RATE: 87 BPM | WEIGHT: 288 LBS | BODY MASS INDEX: 40.32 KG/M2 | RESPIRATION RATE: 16 BRPM | OXYGEN SATURATION: 96 %

## 2019-12-26 DIAGNOSIS — J06.9 VIRAL URI: ICD-10-CM

## 2019-12-26 DIAGNOSIS — R68.89 FLU-LIKE SYMPTOMS: Primary | ICD-10-CM

## 2019-12-26 LAB
FLUAV+FLUBV AG NOSE QL IA.RAPID: NEGATIVE POS/NEG
FLUAV+FLUBV AG NOSE QL IA.RAPID: NEGATIVE POS/NEG
VALID INTERNAL CONTROL?: YES

## 2019-12-26 RX ORDER — FLUTICASONE PROPIONATE 50 MCG
2 SPRAY, SUSPENSION (ML) NASAL DAILY
Qty: 1 BOTTLE | Refills: 6 | Status: SHIPPED | OUTPATIENT
Start: 2019-12-26

## 2019-12-26 RX ORDER — CETIRIZINE HCL 10 MG
10 TABLET ORAL
Qty: 30 TAB | Refills: 6 | Status: SHIPPED | OUTPATIENT
Start: 2019-12-26 | End: 2021-08-12 | Stop reason: SDUPTHER

## 2019-12-26 NOTE — PROGRESS NOTES
1. Have you been to the ER, urgent care clinic since your last visit? Hospitalized since your last visit? No    2. Have you seen or consulted any other health care providers [facility] of [facility]? Include any pap smears or colon screening.  Yes, Neurologist ([place])     Health Maintenance Due   Topic Date Due    Influenza Age 5 to Adult  08/01/2019

## 2019-12-26 NOTE — PROGRESS NOTES
Chief Complaint   Patient presents with    Cold Symptoms       Subjective:   Gene Schultz is a 53 y.o. male who complains of congestion and sore throat for 5 days, gradually worsening since that time. He denies a history of shortness of breath and wheezing. Evaluation to date: none. Treatment to date: OTC products. Patient does not smoke cigarettes. Relevant PMH: No pertinent additional PMH. Patient Active Problem List   Diagnosis Code    Obesity (BMI 30-39. 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 30 Tab 6    fluticasone propionate (FLONASE) 50 mcg/actuation nasal spray 2 Sprays by Both Nostrils route daily. Administer to right and left nostrils. 1 Bottle 6    atorvastatin (LIPITOR) 40 mg tablet take 1 tablet by mouth once daily 90 Tab 3    interferon beta-1a (AVONEX) 30 mcg/0.5 mL injection Inject 30 mcg intramuscularly once a week as directed 4 Kit 6    ibuprofen (MOTRIN) 800 mg tablet Take 1 Tab by mouth every eight (8) hours as needed for Pain. 30 Tab 0     No Known Allergies     Review of Systems  Pertinent items are noted in HPI. Objective:     Visit Vitals  /86 (BP 1 Location: Left arm, BP Patient Position: Sitting)   Pulse 87   Temp 98 °F (36.7 °C) (Oral)   Resp 16   Ht 5' 11\" (1.803 m)   Wt 288 lb (130.6 kg)   SpO2 96%   BMI 40.17 kg/m²     General:  alert, cooperative, no distress   Eyes: conjunctivae/corneas clear. PERRL, EOM's intact.  Fundi benign   Ears: normal TM's and external ear canals AU   Sinuses: Normal paranasal sinuses without tenderness   Mouth:  Lips, mucosa, and tongue normal. Teeth and gums normal   Neck: supple, symmetrical, trachea midline and no adenopathy. Heart: S1 and S2 normal, no murmurs noted. Lungs: clear to auscultation bilaterally        Assessment/Plan:   viral upper respiratory illness  Suggested symptomatic OTC remedies. RTC prn. Discussed diagnosis and treatment of viral URIs. Discussed the importance of avoiding unnecessary antibiotic therapy. ICD-10-CM ICD-9-CM    1. Flu-like symptoms R68.89 780.99 AMB POC KATIE INFLUENZA A/B TEST   2. Viral URI J06.9 465.9 cetirizine (ZYRTEC) 10 mg tablet      fluticasone propionate (FLONASE) 50 mcg/actuation nasal spray     Encounter Diagnoses   Name Primary?  Flu-like symptoms Yes    Viral URI      Orders Placed This Encounter    AMB POC KATIE INFLUENZA A/B TEST    cetirizine (ZYRTEC) 10 mg tablet    fluticasone propionate (FLONASE) 50 mcg/actuation nasal spray   .

## 2020-01-30 ENCOUNTER — HOSPITAL ENCOUNTER (OUTPATIENT)
Dept: GENERAL RADIOLOGY | Age: 45
Discharge: HOME | End: 2020-01-30
Payer: COMMERCIAL

## 2020-01-30 ENCOUNTER — OFFICE VISIT (OUTPATIENT)
Dept: FAMILY MEDICINE CLINIC | Age: 45
End: 2020-01-30

## 2020-01-30 VITALS
TEMPERATURE: 98 F | SYSTOLIC BLOOD PRESSURE: 126 MMHG | HEIGHT: 71 IN | RESPIRATION RATE: 16 BRPM | BODY MASS INDEX: 41.19 KG/M2 | HEART RATE: 72 BPM | OXYGEN SATURATION: 95 % | DIASTOLIC BLOOD PRESSURE: 80 MMHG | WEIGHT: 294.2 LBS

## 2020-01-30 DIAGNOSIS — I83.892 VARICOSE VEINS OF LEFT LEG WITH EDEMA: Primary | ICD-10-CM

## 2020-01-30 DIAGNOSIS — M25.562 ACUTE PAIN OF LEFT KNEE: ICD-10-CM

## 2020-01-30 DIAGNOSIS — Z23 ENCOUNTER FOR IMMUNIZATION: ICD-10-CM

## 2020-01-30 PROCEDURE — 73562 X-RAY EXAM OF KNEE 3: CPT

## 2020-01-30 NOTE — PROGRESS NOTES
Chief Complaint   Patient presents with    Knee Swelling     left    Knee Pain     left     1. Have you been to the ER, urgent care clinic since your last visit? Hospitalized since your last visit? No    2. Have you seen or consulted any other health care providers outside of the [address] [place] [address] since your last visit? Include any pap smears or colon screening. No    Health maintenance reviewed. Pt informed of health maintenance past due and/or upcoming. Pt verbalized understanding. Health Maintenance Due   Topic Date Due    Influenza Age 5 to Adult  08/01/2019     Pt is fasting this visit. Pt voiced that his knee pain started around 2 weeks ago.

## 2020-01-30 NOTE — PROGRESS NOTES
Chief Complaint   Patient presents with    Knee Swelling     left    Knee Pain     left     Pt reports that he has noticed increased pain in his left knee for two weeks, has also had swelling above his knee that is tender to touch. Pt report sthat he stands for long periods of time on concrete. Subjective: (As above and below)     Chief Complaint   Patient presents with    Knee Swelling     left    Knee Pain     left     he is a 53y.o. year old male who presents for evaluation. Reviewed PmHx, RxHx, FmHx, SocHx, AllgHx and updated in chart. Review of Systems - negative except as listed above    Objective:     Vitals:    01/30/20 0759   BP: 126/80   Pulse: 72   Resp: 16   Temp: 98 °F (36.7 °C)   TempSrc: Oral   SpO2: 95%   Weight: 294 lb 3.2 oz (133.4 kg)   Height: 5' 11\" (1.803 m)     Physical Examination: General appearance - alert, well appearing, and in no distress  Mental status - normal mood, behavior, speech, dress, motor activity, and thought processes  Mouth - mucous membranes moist, pharynx normal without lesions  Chest - clear to auscultation, no wheezes, rales or rhonchi, symmetric air entry  Heart - normal rate, regular rhythm, normal S1, S2, no murmurs, rubs, clicks or gallops  Musculoskeletal - very large bundle of varicose veins left leg, just medial and above the knee    Assessment/ Plan:   1. Varicose veins of left leg with edema  -refer for evalustion  - REFERRAL TO VASCULAR SURGERY    2. Acute pain of left knee  - XR KNEE LT MIN 4 V; Future    3. Encounter for immunization  - INFLUENZA VIRUS VAC QUAD,SPLIT,PRESV FREE SYRINGE IM  - GA IMMUNIZ ADMIN,1 SINGLE/COMB VAC/TOXOID       I have discussed the diagnosis with the patient and the intended plan as seen in the above orders. The patient has received an after-visit summary and questions were answered concerning future plans.      Medication Side Effects and Warnings were discussed with patient: yes  Patient Labs were reviewed: yes  Patient Past Records were reviewed:  yes    Marguerite Tran M.D. Discussed the patient's BMI with him. The BMI follow up plan is as follows:     dietary management education, guidance, and counseling  encourage exercise  monitor weight  prescribed dietary intake    An After Visit Summary was printed and given to the patient.

## 2020-01-30 NOTE — PATIENT INSTRUCTIONS
Varicose Veins: Care Instructions Your Care Instructions Varicose veins are twisted, enlarged veins near the surface of the skin. They develop most often in the legs and ankles. Some people may be more likely than others to get varicose veins because of aging or hormone changes or because a parent has them. Being overweight or pregnant can make varicose veins worse. Jobs that require standing for long periods of time also can make them worse. Follow-up care is a key part of your treatment and safety. Be sure to make and go to all appointments, and call your doctor if you are having problems. It's also a good idea to know your test results and keep a list of the medicines you take. How can you care for yourself at home? · Wear compression stockings during the day to help relieve symptoms. They improve blood flow and are the main treatment for varicose veins. Talk to your doctor about which ones to get and where to get them. · Prop up your legs at or above the level of your heart when possible. This helps keep the blood from pooling in your lower legs and improves blood flow to the rest of your body. · Avoid sitting and standing for long periods. This puts added stress on your veins. · Get regular exercise, and control your weight. Walk, bicycle, or swim to improve blood flow in your legs. · If you bump your leg so hard that you know it is likely to bruise, prop up your leg and put ice or a cold pack on the area for 10 to 20 minutes at a time. Try to do this every 1 to 2 hours for the next 3 days (when you are awake) or until the swelling goes down. Put a thin cloth between the ice and your skin. · If you cut or scratch the skin over a vein, it may bleed a lot. Prop up your leg and apply firm pressure with a clean bandage over the site of the bleeding. Continue to apply pressure for a full 15 minutes. Do not check sooner to see if the bleeding has stopped.  If the bleeding has not stopped after 15 minutes, apply pressure again for another 15 minutes. You can repeat this up to 3 times for a total of 45 minutes. If you have a blood clot in a varicose vein, you may have tenderness and swelling over the vein. The vein may feel firm. Be sure to call your doctor right away if you have these symptoms. If your doctor has told you how to care for the clot, follow his or her instructions. Care may include the following: · Prop up your leg and apply heat with a warm, damp cloth or a heating pad set on low (put a towel or cloth between your leg and the heating pad to prevent burns). · Ask your doctor if you can take an over-the-counter pain medicine, such as acetaminophen (Tylenol), ibuprofen (Advil, Motrin), or naproxen (Aleve). Be safe with medicines. Read and follow all instructions on the label. When should you call for help? Call 911 anytime you think you may need emergency care. For example, call if: 
  · You have sudden chest pain and shortness of breath, or you cough up blood.  
 Call your doctor now or seek immediate medical care if: 
  · You have signs of a blood clot, such as: 
? Pain in your calf, back of the knee, thigh, or groin. ? Redness and swelling in your leg or groin.  
  · A varicose vein begins to bleed and you cannot stop it.  
  · You have a tender lump in your leg.  
  · You get an open sore.  
 Watch closely for changes in your health, and be sure to contact your doctor if: 
  · Your varicose vein symptoms do not improve with home treatment. Where can you learn more? Go to http://beverly-gume.info/. Enter N471 in the search box to learn more about \"Varicose Veins: Care Instructions. \" Current as of: September 26, 2018 Content Version: 12.2 © 0235-4849 [employer]. Care instructions adapted under license by [employer] (which disclaims liability or warranty for this information).  If you have questions about a medical condition or this instruction, always ask your healthcare professional. [address] any warranty or liability for your use of this information. Body Mass Index: Care Instructions Your Care Instructions Body mass index (BMI) can help you see if your weight is raising your risk for health problems. It uses a formula to compare how much you weigh with how tall you are. · A BMI lower than 18.5 is considered underweight. · A BMI between 18.5 and 24.9 is considered healthy. · A BMI between 25 and 29.9 is considered overweight. A BMI of 30 or higher is considered obese. If your BMI is in the normal range, it means that you have a lower risk for weight-related health problems. If your BMI is in the overweight or obese range, you may be at increased risk for weight-related health problems, such as high blood pressure, heart disease, stroke, arthritis or joint pain, and diabetes. If your BMI is in the underweight range, you may be at increased risk for health problems such as fatigue, lower protection (immunity) against illness, muscle loss, bone loss, hair loss, and hormone problems. BMI is just one measure of your risk for weight-related health problems. You may be at higher risk for health problems if you are not active, you eat an unhealthy diet, or you drink too much alcohol or use tobacco products. Follow-up care is a key part of your treatment and safety. Be sure to make and go to all appointments, and call your doctor if you are having problems. It's also a good idea to know your test results and keep a list of the medicines you take. How can you care for yourself at home? · Practice healthy eating habits. This includes eating plenty of fruits, vegetables, whole grains, lean protein, and low-fat dairy. · If your doctor recommends it, get more exercise. Walking is a good choice. Bit by bit, increase the amount you walk every day. Try for at least 30 minutes on most days of the week. · Do not smoke. Smoking can increase your risk for health problems. If you need help quitting, talk to your doctor about stop-smoking programs and medicines. These can increase your chances of quitting for good. · Limit alcohol to 2 drinks a day for men and 1 drink a day for women. Too much alcohol can cause health problems. If you have a BMI higher than 25 · Your doctor may do other tests to check your risk for weight-related health problems. This may include measuring the distance around your waist. A waist measurement of more than 40 inches in men or 35 inches in women can increase the risk of weight-related health problems. · Talk with your doctor about steps you can take to stay healthy or improve your health. You may need to make lifestyle changes to lose weight and stay healthy, such as changing your diet and getting regular exercise. If you have a BMI lower than 18.5 · Your doctor may do other tests to check your risk for health problems. · Talk with your doctor about steps you can take to stay healthy or improve your health. You may need to make lifestyle changes to gain or maintain weight and stay healthy, such as getting more healthy foods in your diet and doing exercises to build muscle. Where can you learn more? Go to http://beverly-gume.info/. Enter S176 in the search box to learn more about \"Body Mass Index: Care Instructions. \" Current as of: October 13, 2016 Content Version: 11.4 © 4405-2758 [employer]. Care instructions adapted under license by [employer] (which disclaims liability or warranty for this information). If you have questions about a medical condition or this instruction, always ask your healthcare professional. [place] [address] any warranty or liability for your use of this information. Vaccine Information Statement Influenza (Flu) Vaccine (Inactivated or Recombinant): What You Need to Know Many Vaccine Information Statements are available in Palauan and other languages. See www.immunize.org/vis Hojas de información sobre vacunas están disponibles en español y en muchos otros idiomas. Visite www.immunize.org/vis 1. Why get vaccinated? Influenza vaccine can prevent influenza (flu). Flu is a contagious disease that spreads around the [place] [facility] every year, usually between October and May. Anyone can get the flu, but it is more dangerous for some people. Infants and young children, people 72years of age and older, pregnant women, and people with certain health conditions or a weakened immune system are at greatest risk of flu complications. Pneumonia, bronchitis, sinus infections and ear infections are examples of flu-related complications. If you have a medical condition, such as heart disease, cancer or diabetes, flu can make it worse. Flu can cause fever and chills, sore throat, muscle aches, fatigue, cough, headache, and runny or stuffy nose. Some people may have vomiting and diarrhea, though this is more common in children than adults. Each year thousands of people in the [facility] die from flu, and many more are hospitalized. Flu vaccine prevents millions of illnesses and flu-related visits to the doctor each year. 2. Influenza vaccines CDC recommends everyone 10months of age and older get vaccinated every flu season. Children 6 months through 6years of age may need 2 doses during a single flu season. Everyone else needs only 1 dose each flu season. It takes about 2 weeks for protection to develop after vaccination. There are many flu viruses, and they are always changing. Each year a new flu vaccine is made to protect against three or four viruses that are likely to cause disease in the upcoming flu season. Even when the vaccine doesnt exactly match these viruses, it may still provide some protection. Influenza vaccine does not cause flu. Influenza vaccine may be given at the same time as other vaccines. 3. Talk with your health care provider Tell your vaccine provider if the person getting the vaccine: 
 Has had an allergic reaction after a previous dose of influenza vaccine, or has any severe, life-threatening allergies.  Has ever had Guillain-Barré Syndrome (also called GBS). In some cases, your health care provider may decide to postpone influenza vaccination to a future visit. People with minor illnesses, such as a cold, may be vaccinated. People who are moderately or severely ill should usually wait until they recover before getting influenza vaccine. Your health care provider can give you more information. 4. Risks of a reaction  Soreness, redness, and swelling where shot is given, fever, muscle aches, and headache can happen after influenza vaccine.  There may be a very small increased risk of Guillain-Barré Syndrome (GBS) after inactivated influenza vaccine (the flu shot). [facility] children who get the flu shot along with pneumococcal vaccine (PCV13), and/or DTaP vaccine at the same time might be slightly more likely to have a seizure caused by fever. Tell your health care provider if a child who is getting flu vaccine has ever had a seizure. People sometimes faint after medical procedures, including vaccination. Tell your provider if you feel dizzy or have vision changes or ringing in the ears. As with any medicine, there is a very remote chance of a vaccine causing a severe allergic reaction, other serious injury, or death. 5. What if there is a serious problem? An allergic reaction could occur after the vaccinated person leaves the clinic.  If you see signs of a severe allergic reaction (hives, swelling of the face and throat, difficulty breathing, a fast heartbeat, dizziness, or weakness), call 9-1-1 and get the person to the nearest hospital. 
 
 For other signs that concern you, call your health care provider. Adverse reactions should be reported to the Vaccine Adverse Event Reporting System (VAERS). Your health care provider will usually file this report, or you can do it yourself. Visit the VAERS website at www.vaers. hhs.gov or call 1-491.118.3611. VAERS is only for reporting reactions, and VAERS staff do not give medical advice. 6. The National Vaccine Injury Compensation Program 
 
The [facility] Vaccine Injury Compensation Program (VICP) is a federal program that was created to compensate people who may have been injured by certain vaccines. Visit the VICP website at www.[facility] [[facility]]a.gov/vaccinecompensation or call 3-674.731.5533 to learn about the program and about filing a claim. There is a time limit to file a claim for compensation. 7. How can I learn more?  Ask your health care provider.  Call your local or state health department.  Contact the Centers for Disease Control and Prevention (CDC): 
- Call 2-526.277.5213 (1-800-CDC-INFO) or 
- Visit CDCs influenza website at www.cdc.gov/flu Vaccine Information Statement (Interim) Inactivated Influenza Vaccine 8/15/2019 
[address] Madrigal [id number] Department of Health and [employer] Centers for Disease Control and Prevention Office Use Only

## 2020-08-31 ENCOUNTER — VIRTUAL VISIT (OUTPATIENT)
Dept: FAMILY MEDICINE CLINIC | Age: 45
End: 2020-08-31
Payer: COMMERCIAL

## 2020-08-31 DIAGNOSIS — J06.9 UPPER RESPIRATORY TRACT INFECTION, UNSPECIFIED TYPE: ICD-10-CM

## 2020-08-31 DIAGNOSIS — J40 BRONCHITIS: Primary | ICD-10-CM

## 2020-08-31 DIAGNOSIS — R05.9 COUGH: ICD-10-CM

## 2020-08-31 PROCEDURE — 99213 OFFICE O/P EST LOW 20 MIN: CPT | Performed by: NURSE PRACTITIONER

## 2020-08-31 RX ORDER — BENZONATATE 200 MG/1
200 CAPSULE ORAL
Qty: 21 CAP | Refills: 0 | Status: SHIPPED | OUTPATIENT
Start: 2020-08-31 | End: 2020-09-07

## 2020-08-31 RX ORDER — AZITHROMYCIN 250 MG/1
TABLET, FILM COATED ORAL
Qty: 6 TAB | Refills: 0 | Status: SHIPPED | OUTPATIENT
Start: 2020-08-31 | End: 2020-09-05

## 2020-08-31 NOTE — PROGRESS NOTES
Subjective:   Shasha Lerma is a 50 y.o. male who was seen by synchronous (real-time) audio-video technology on 8/31/2020 for Cough (X 1 WEEK. TRIED ALLEGRA, FLONASE, SAYR SALINE MIST, OTC COUGH MEDICINE)    Cough for 1-2 weeks. No sinus congestion, some runny nose. Has allergies, using allegra and flonase and cough medicine (like robitussin or mucinex)  Denies wheezing or SOB or fever. Cough is productive sometimes. Denies Covid 19 exposure that he knows of. Prior to Admission medications    Medication Sig Start Date End Date Taking? Authorizing Provider   cetirizine (ZYRTEC) 10 mg tablet Take 1 Tab by mouth daily as needed for Allergies. 12/26/19  Yes Doroteo Henao MD   fluticasone propionate (FLONASE) 50 mcg/actuation nasal spray 2 Sprays by Both Nostrils route daily. Administer to right and left nostrils. 12/26/19  Yes Doroteo Henao MD   atorvastatin (LIPITOR) 40 mg tablet take 1 tablet by mouth once daily 10/28/19  Yes Doroteo Henao MD   interferon beta-1a (AVONEX) 30 mcg/0.5 mL injection Inject 30 mcg intramuscularly once a week as directed 8/13/19  Yes Brooke Leon MD   ibuprofen (MOTRIN) 800 mg tablet Take 1 Tab by mouth every eight (8) hours as needed for Pain.  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    Strabismus 06/21/2015    Obesity (BMI 30-39.9) 10/21/2014     Past Medical History:   Diagnosis Date    Clinically isolated syndrome ([address] 6/30/2015    GERD (gastroesophageal reflux disease)     Muscle weakness     Vertigo      Past Surgical History:   Procedure Laterality Date    HX CHOLECYSTECTOMY      HX HERNIA REPAIR Right        ROS  Gen: no fatigue, fever, chills  Eyes: no excessive tearing, itching, or discharge  Nose: no rhinorrhea, no sinus pain  Mouth: no oral lesions, no sore throat  Resp: no shortness of breath, no wheezing, no cough  CV: no chest pain, no paroxysmal nocturnal dyspnea  Abd: no nausea, no heartburn, no diarrhea, no constipation, no abdominal pain  Neuro: no headaches, no syncope or presyncopal episodes  Endo: no polyuria, no polydipsia  Heme: no lymphadenopathy, no easy bruising or bleeding    Objective:   No flowsheet data found. [INSTRUCTIONS:  \"[x]\" Indicates a positive item  \"[]\" Indicates a negative item  -- DELETE ALL ITEMS NOT EXAMINED]    Constitutional: [x] Appears well-developed and well-nourished [x] No apparent distress          Mental status: [x] Alert and awake  [x] Oriented to person/place/time [x] Able to follow commands        Eyes:   EOM    [x]  Normal      Sclera  [x]  Normal              Discharge [x]  None visible       HENT: [x] Normocephalic, atraumatic     [x] Mouth/Throat: Mucous membranes are moist      Neck: [x] No visualized mass   Pulmonary/Chest: [x] Respiratory effort normal   [x] No visualized signs of difficulty breathing or respiratory distress             Musculoskeletal:   [x] Normal gait with no signs of ataxia         [x] Normal range of motion of neck         Neurological:        [x] No Facial Asymmetry (Cranial nerve 7 motor function) (limited exam due to video visit)          [x] No gaze palsy                 Skin:        [x] No significant exanthematous lesions or discoloration noted on facial skin                     Psychiatric:       [x] Normal Affect        [x] No Hallucinations        Assessment & Plan:   Diagnoses and all orders for this visit:    1. Bronchitis  -     azithromycin (ZITHROMAX) 250 mg tablet; Take 2 tablets today, then take 1 tablet daily    2. Cough  -     benzonatate (TESSALON) 200 mg capsule;  Take 1 Cap by mouth three (3) times daily as needed for Cough for up to 7 days. 3. Upper respiratory tract infection, unspecified type        Symptoms ongoing for two weeks. Will treat for probable bronchitis. He will call if worsening or no improvement. We discussed the expected course, resolution and complications of the diagnosis(es) in detail. Medication risks, benefits, costs, interactions, and alternatives were discussed as indicated. I advised him to contact the office if his condition worsens, changes or fails to improve as anticipated. He expressed understanding with the diagnosis(es) and plan. Tala Modi LESTER, who was evaluated through a patient-initiated, synchronous (real-time) audio-video encounter, and/or his healthcare decision maker, is aware that it is a billable service, with coverage as determined by his insurance carrier. He provided verbal consent to proceed: Yes, and patient identification was verified. It was conducted pursuant to the emergency declaration under the [address] [place] [address], [address] [place] [address] authority and the [employer] and [employer] General Act. A caregiver was present when appropriate. Ability to conduct physical exam was limited. I was at home. The patient was at home. Doxy. me used for this visit.      Troy Mccoy NP

## 2020-08-31 NOTE — PROGRESS NOTES
1. Have you been to the ER, urgent care clinic since your last visit? Hospitalized since your last visit? No    2. Have you seen or consulted any other health care providers outside of the [address] [place] [address] since your last visit? Include any pap smears or colon screening.  No     Health Maintenance Due   Topic Date Due    Lipid Screen  11/22/2017

## 2020-09-21 ENCOUNTER — VIRTUAL VISIT (OUTPATIENT)
Dept: FAMILY MEDICINE CLINIC | Age: 45
End: 2020-09-21
Payer: COMMERCIAL

## 2020-09-21 DIAGNOSIS — R05.9 COUGH: Primary | ICD-10-CM

## 2020-09-21 PROCEDURE — 99213 OFFICE O/P EST LOW 20 MIN: CPT | Performed by: FAMILY MEDICINE

## 2020-09-21 RX ORDER — DOXYCYCLINE 100 MG/1
100 CAPSULE ORAL 2 TIMES DAILY
Qty: 20 CAP | Refills: 0 | Status: SHIPPED | OUTPATIENT
Start: 2020-09-21 | End: 2020-10-01

## 2020-09-21 NOTE — PROGRESS NOTES
Chief Complaint   Patient presents with    Cough     Patient is seen today via virtual video visit. Patient reports that he has had a cough for a couple of weeks, seems to be gradually increasing. Patient denies any fever, body aches or other symptoms. Patient reports primarily sinus pressure. Rhina Schneider is a 50 y.o. male who was seen by synchronous (real-time) audio-video technology on 9/21/2020 for Cough        Assessment & Plan:   Diagnoses and all orders for this visit:    1. Cough  -     doxycycline (VIBRAMYCIN) 100 mg capsule; Take 1 Cap by mouth two (2) times a day for 10 days. Discussed concern for possible COVID, reviewed symptoms that would require testing and further evaluation. Agreed to treat as a sinus infection at this time. If patient is not showing improvement in 2 to 3 days then we would need to reevaluate treatment plan    Subjective:       Prior to Admission medications    Medication Sig Start Date End Date Taking? Authorizing Provider   doxycycline (VIBRAMYCIN) 100 mg capsule Take 1 Cap by mouth two (2) times a day for 10 days. 9/21/20 10/1/20 Yes Vernon Henao MD   cetirizine (ZYRTEC) 10 mg tablet Take 1 Tab by mouth daily as needed for Allergies. 12/26/19  Yes Vernon Henao MD   fluticasone propionate (FLONASE) 50 mcg/actuation nasal spray 2 Sprays by Both Nostrils route daily. Administer to right and left nostrils. 12/26/19  Yes Vernon Henao MD   atorvastatin (LIPITOR) 40 mg tablet take 1 tablet by mouth once daily 10/28/19  Yes Vernon Henao MD   interferon beta-1a (AVONEX) 30 mcg/0.5 mL injection Inject 30 mcg intramuscularly once a week as directed 8/13/19  Yes Cachorro Burr MD   ibuprofen (MOTRIN) 800 mg tablet Take 1 Tab by mouth every eight (8) hours as needed for Pain. 11/22/16  Yes Dwain Cline MD     Patient Active Problem List   Diagnosis Code    Obesity (BMI 30-39. 9) E66.9    Strabismus H50.9    Hypercholesterolemia E78.00    Partial third nerve palsy H49.00    Clinically isolated syndrome (HCC) G37.9    Obesity, morbid (HCC) E66.01    Multiple sclerosis ([facility] G35       Review of Systems   Constitutional: Negative for chills, fever and malaise/fatigue. HENT: Positive for congestion and sinus pain. Negative for sore throat. Respiratory: Negative for cough and shortness of breath. Cardiovascular: Negative for chest pain and palpitations. Gastrointestinal: Negative for abdominal pain, heartburn, nausea and vomiting. Genitourinary: Negative for dysuria and urgency. Musculoskeletal: Negative for joint pain and myalgias. Neurological: Negative for dizziness, tingling and headaches. All other systems reviewed and are negative.       Objective:     Patient-Reported Vitals 9/21/2020   Patient-Reported Weight 290lb        [INSTRUCTIONS:  \"[x]\" Indicates a positive item  \"[]\" Indicates a negative item  -- DELETE ALL ITEMS NOT EXAMINED]    Constitutional: [x] Appears well-developed and well-nourished [x] No apparent distress      [] Abnormal -     Mental status: [x] Alert and awake  [x] Oriented to person/place/time [x] Able to follow commands    [] Abnormal -     Eyes:   EOM    [x]  Normal    [] Abnormal -   Sclera  [x]  Normal    [] Abnormal -          Discharge [x]  None visible   [] Abnormal -     HENT: [x] Normocephalic, atraumatic  [] Abnormal -   [x] Mouth/Throat: Mucous membranes are moist    External Ears [x] Normal  [] Abnormal -    Neck: [x] No visualized mass [] Abnormal -     Pulmonary/Chest: [x] Respiratory effort normal   [x] No visualized signs of difficulty breathing or respiratory distress        [] Abnormal -      Musculoskeletal:   [x] Normal gait with no signs of ataxia         [x] Normal range of motion of neck        [] Abnormal -     Neurological:        [x] No Facial Asymmetry (Cranial nerve 7 motor function) (limited exam due to video visit)          [x] No gaze palsy [] Abnormal -          Skin:        [x] No significant exanthematous lesions or discoloration noted on facial skin         [] Abnormal -            Psychiatric:       [x] Normal Affect [] Abnormal -        [x] No Hallucinations    Other pertinent observable physical exam findings:-        We discussed the expected course, resolution and complications of the diagnosis(es) in detail. Medication risks, benefits, costs, interactions, and alternatives were discussed as indicated. I advised him to contact the office if his condition worsens, changes or fails to improve as anticipated. He expressed understanding with the diagnosis(es) and plan. Jennie Bradford LESTER, who was evaluated through a patient-initiated, synchronous (real-time) audio-video encounter, and/or his healthcare decision maker, is aware that it is a billable service, with coverage as determined by his insurance carrier. He provided verbal consent to proceed: Yes, and patient identification was verified. It was conducted pursuant to the emergency declaration under the [address] [place] [address] authority and the [employer] and [employer] General Act. A caregiver was present when appropriate. Ability to conduct physical exam was limited. I was in the office. The patient was at home.       Charlie Frazier MD

## 2020-09-21 NOTE — PROGRESS NOTES
Chief Complaint   Patient presents with    Cough       1. Have you been to the ER, urgent care clinic since your last visit? Hospitalized since your last visit? No    2. Have you seen or consulted any other health care providers outside of the [address] [place] [address] since your last visit? Include any pap smears or colon screening.  No    Health Maintenance Due   Topic Date Due    Lipid Screen  11/22/2017    Flu Vaccine (1) 09/01/2020

## 2020-09-23 ENCOUNTER — OFFICE VISIT (OUTPATIENT)
Dept: NEUROLOGY | Age: 45
End: 2020-09-23
Payer: COMMERCIAL

## 2020-09-23 VITALS
OXYGEN SATURATION: 97 % | HEART RATE: 116 BPM | WEIGHT: 290 LBS | DIASTOLIC BLOOD PRESSURE: 86 MMHG | HEIGHT: 71 IN | BODY MASS INDEX: 40.6 KG/M2 | SYSTOLIC BLOOD PRESSURE: 122 MMHG

## 2020-09-23 DIAGNOSIS — G35 MULTIPLE SCLEROSIS (HCC): Primary | ICD-10-CM

## 2020-09-23 DIAGNOSIS — E78.2 MIXED HYPERLIPIDEMIA: ICD-10-CM

## 2020-09-23 PROCEDURE — 99214 OFFICE O/P EST MOD 30 MIN: CPT | Performed by: PSYCHIATRY & NEUROLOGY

## 2020-09-23 NOTE — PROGRESS NOTES
"Yvonne Sesay is a 34 year old female  who calls with abdominal pain. She says that it is on her right side. And her stomach feels \"tight, tense, there is a dull constant pain. Cramping.\" She threw-up a few times last night. Her right side is sensitive to the touch.   The pain began about 5pm yesterday.  The pain is rated a sitting 1-2/10, if she presses on right side then it is a 5/10, .  LMP  Was now  (started Wed).  The pain is described as dull and pressure and is located RUQ and RLQ, which is without radiation.  Symptom associated with the abdominal pain anorexia, nausea, vomiting, bloating, belching, chills, sweats and headache.  Patient has had previous abdominal surgery , including none.    Patient has not a previous history of similar symptoms: no     Pain is aggravated by movement and pressure, and relieved by nothing.    Plan: Emergency Room--[facility] ED  Patient agrees with this plan.    Patient to call back if symptoms do not improve, symptoms worsen, or with further questions or concerns. If clinic is closed, patient to use urgent care or emergency department.    Encounter handled by: Nurse Triage.    Ani Reynolds RN    " Name: Eliana Mckeon IV    Chief Complaint: Multiple sclerosis    Returns for follow-up visit. Since last seen has not had any symptoms of sensory loss or motor deficits. He has had no double vision or other visual changes. Has been compliant with his medications. Experiences no side effects. He would rather not have an MRI to avoid exposure to COVID. Assesment and Plan  1. Multipl sclerosis  continue avonex  He has been on avonex for CIS apparently had an KINA 2016   He had MRI evidence of several demyelinating lesions. Has 7 bands in the CSF. He has no new weakness or sensory loss    2. Hyperlipidemia  Continue atorvastatin    Allergies  Patient has no known allergies. Medications  Current Outpatient Medications   Medication Sig    doxycycline (VIBRAMYCIN) 100 mg capsule Take 1 Cap by mouth two (2) times a day for 10 days.  cetirizine (ZYRTEC) 10 mg tablet Take 1 Tab by mouth daily as needed for Allergies.  fluticasone propionate (FLONASE) 50 mcg/actuation nasal spray 2 Sprays by Both Nostrils route daily. Administer to right and left nostrils.  atorvastatin (LIPITOR) 40 mg tablet take 1 tablet by mouth once daily    interferon beta-1a (AVONEX) 30 mcg/0.5 mL injection Inject 30 mcg intramuscularly once a week as directed    ibuprofen (MOTRIN) 800 mg tablet Take 1 Tab by mouth every eight (8) hours as needed for Pain. No current facility-administered medications for this visit. Medical History  Past Medical History:   Diagnosis Date    [facility] syndrome [facility] 6/30/2015    GERD (gastroesophageal reflux disease)     Muscle weakness     Vertigo      Review of Systems   Eyes: Negative for blurred vision and double vision. Respiratory: Negative for cough and shortness of breath. Cardiovascular: Negative for chest pain, palpitations and orthopnea. Gastrointestinal: Negative for nausea and vomiting. Neurological: Negative for dizziness and headaches. Psychiatric/Behavioral: Negative for depression. The patient is not nervous/anxious. Exam:  Visit Vitals  /86   Pulse (!) 116   Ht 5' 11\" (1.803 m)   Wt 290 lb (131.5 kg)   SpO2 97%   BMI 40.45 kg/m²      General: Well developed, well nourished. Patient in no apparent distress   Head: Normocephalic, atraumatic, anicteric sclera   Neck Normal ROM, No thyromegally   Lungs:  Clear to auscultation bilaterally, No wheezes or rubs   Cardiac: Regular rate and rhythm with no murmurs. Abd: Bowel sounds were audible. No tenderness on palpation   Ext: No pedal edema   Skin: Supple no rash     NeurologicExam:  Mental Status: Alert and oriented to person place and time   Speech: Fluent no aphasia or dysarthria. Cranial Nerves:  II - XII Intact   Motor:  Full and symmetric strength of upper and lower proximal and distal muscles. Normal bulk and tone. Reflexes:   Deep tendon reflexes 2+/4 and symmetric. Sensory:   Symmetric and intact with no perceived deficits modalities involving small or large fibers. Gait:  Gait is balanced and fluid with normal arm swing. Tremor:   No tremor noted. Cerebellar:  Coordination intact. Neurovascular: No carotid bruits. No JVD               Imaging  CT Results (most recent):  Results from Hospital Encounter encounter on 06/21/15   CT HEAD WO CONT    Narrative **Final Report**       ICD Codes / Adm. Diagnosis: 568133  226447 / Dizziness  Double Vision  Examination:  CT HEAD WO CON  - [id number] - Jun 21 2015  3:18PM  Accession No:  [id number]  Reason:  Pain      REPORT:  EXAM:  CT HEAD WO CON    INDICATION:   Pain    COMPARISON: None. TECHNIQUE: Unenhanced CT of the head was performed using 5 mm images. Brain   and bone windows were generated. FINDINGS:  The ventricles and sulci are normal in size, shape and configuration and   midline. There is no significant white matter disease.  There is no   intracranial hemorrhage, extra-axial collection, mass, mass effect or midline shift. The basilar cisterns are open. No acute infarct is   identified. The bone windows demonstrate no abnormalities. The visualized   portions of the paranasal sinuses and mastoid air cells are clear. IMPRESSION: Normal study              Signing/Reading Doctor: Jayshree Barone ([id number])    Approved: Jayshree Barone ([id number])  Jun 21 2015  3:24PM                                      MRI Results (most recent):  Results from Hospital Encounter encounter on 06/14/18   MRI BRAIN W WO CONT    Narrative Clinical indication: Migraines. Technical factors. Diffusion imaging, sagittal T1-weighted pre and post 20 cc  IV. Time axial T1-weighted pre and postcontrast T2 weighted FLAIR gradient echo  coronal T1-weighted postcontrast, comparison May 16, 2017. Diffusion imaging does not show any acute findings. The burden of white matter disease is stable. Ventricle size is also unchanged. There is no enhancing lesion or masses. There is no extra-axial fluid collection  hemorrhage or shift. Impression  impression: Stable burden of white matter disease.          Lab Review  Lab Results   Component Value Date/Time    WBC 6.3 02/21/2019 03:04 PM    HCT 42.7 02/21/2019 03:04 PM    HGB 14.6 02/21/2019 03:04 PM    PLATELET 842 92/25/9470 03:04 PM       Lab Results   Component Value Date/Time    Sodium 142 02/21/2019 03:04 PM    Potassium 3.7 02/21/2019 03:04 PM    Chloride 101 02/21/2019 03:04 PM    CO2 27 02/21/2019 03:04 PM    Glucose 94 02/21/2019 03:04 PM    BUN 11 02/21/2019 03:04 PM    Creatinine 0.77 02/21/2019 03:04 PM    Calcium 8.8 02/21/2019 03:04 PM           Lab Results   Component Value Date/Time    Hemoglobin A1c 5.7 06/21/2015 04:29 PM        Lab Results   Component Value Date/Time    Vitamin B12 332 06/22/2015 02:33 PM           Lab Results   Component Value Date/Time    Cholesterol, total 154 11/22/2016 12:00 AM    HDL Cholesterol 50 11/22/2016 12:00 AM    LDL, calculated 91 11/22/2016 12:00 AM VLDL, calculated 13 11/22/2016 12:00 AM    Triglyceride 64 11/22/2016 12:00 AM    CHOL/HDL Ratio 4.5 06/21/2015 04:29 PM

## 2020-09-29 RX ORDER — INTERFERON BETA-1A 30MCG/.5ML
KIT INTRAMUSCULAR
Qty: 4 KIT | Refills: 11 | Status: SHIPPED | OUTPATIENT
Start: 2020-09-29 | End: 2021-08-30

## 2020-10-01 ENCOUNTER — CLINICAL SUPPORT (OUTPATIENT)
Dept: FAMILY MEDICINE CLINIC | Age: 45
End: 2020-10-01
Payer: COMMERCIAL

## 2020-10-01 DIAGNOSIS — Z13.29 SCREENING FOR THYROID DISORDER: ICD-10-CM

## 2020-10-01 DIAGNOSIS — E78.00 HYPERCHOLESTEROLEMIA: Primary | ICD-10-CM

## 2020-10-01 PROCEDURE — 99000 SPECIMEN HANDLING OFFICE-LAB: CPT | Performed by: FAMILY MEDICINE

## 2020-10-01 PROCEDURE — 36415 COLL VENOUS BLD VENIPUNCTURE: CPT | Performed by: FAMILY MEDICINE

## 2020-10-01 NOTE — PROGRESS NOTES
Reordered labs under Dr. Dhara Marie pt brought in lab slip from Dr. Khushi Conway and was drawn here. Labs drawn per Dr. Dhara Marie.

## 2020-10-02 LAB
ALBUMIN SERPL-MCNC: 4.3 G/DL (ref 4–5)
ALBUMIN/GLOB SERPL: 1.7 {RATIO} (ref 1.2–2.2)
ALP SERPL-CCNC: 85 IU/L (ref 39–117)
ALT SERPL-CCNC: 40 IU/L (ref 0–44)
AST SERPL-CCNC: 22 IU/L (ref 0–40)
BASOPHILS # BLD AUTO: 0 X10E3/UL (ref 0–0.2)
BASOPHILS NFR BLD AUTO: 1 %
BILIRUB SERPL-MCNC: 0.5 MG/DL (ref 0–1.2)
BUN SERPL-MCNC: 13 MG/DL (ref 6–24)
BUN/CREAT SERPL: 17 (ref 9–20)
CALCIUM SERPL-MCNC: 9 MG/DL (ref 8.7–10.2)
CHLORIDE SERPL-SCNC: 101 MMOL/L (ref 96–106)
CHOLEST SERPL-MCNC: 159 MG/DL (ref 100–199)
CO2 SERPL-SCNC: 24 MMOL/L (ref 20–29)
CREAT SERPL-MCNC: 0.78 MG/DL (ref 0.76–1.27)
EOSINOPHIL # BLD AUTO: 0.1 X10E3/UL (ref 0–0.4)
EOSINOPHIL NFR BLD AUTO: 1 %
ERYTHROCYTE [DISTWIDTH] IN BLOOD BY AUTOMATED COUNT: 12.3 % (ref 11.6–15.4)
GLOBULIN SER CALC-MCNC: 2.5 G/DL (ref 1.5–4.5)
GLUCOSE SERPL-MCNC: 79 MG/DL (ref 65–99)
HCT VFR BLD AUTO: 44.4 % (ref 37.5–51)
HDLC SERPL-MCNC: 44 MG/DL
HGB BLD-MCNC: 15.1 G/DL (ref 13–17.7)
IMM GRANULOCYTES # BLD AUTO: 0 X10E3/UL (ref 0–0.1)
IMM GRANULOCYTES NFR BLD AUTO: 0 %
INTERPRETATION, 910389: NORMAL
LDLC SERPL CALC-MCNC: 102 MG/DL (ref 0–99)
LYMPHOCYTES # BLD AUTO: 1.6 X10E3/UL (ref 0.7–3.1)
LYMPHOCYTES NFR BLD AUTO: 24 %
MCH RBC QN AUTO: 30.9 PG (ref 26.6–33)
MCHC RBC AUTO-ENTMCNC: 34 G/DL (ref 31.5–35.7)
MCV RBC AUTO: 91 FL (ref 79–97)
MONOCYTES # BLD AUTO: 0.5 X10E3/UL (ref 0.1–0.9)
MONOCYTES NFR BLD AUTO: 8 %
NEUTROPHILS # BLD AUTO: 4.4 X10E3/UL (ref 1.4–7)
NEUTROPHILS NFR BLD AUTO: 66 %
PLATELET # BLD AUTO: 213 X10E3/UL (ref 150–450)
POTASSIUM SERPL-SCNC: 3.8 MMOL/L (ref 3.5–5.2)
PROT SERPL-MCNC: 6.8 G/DL (ref 6–8.5)
RBC # BLD AUTO: 4.88 X10E6/UL (ref 4.14–5.8)
SODIUM SERPL-SCNC: 140 MMOL/L (ref 134–144)
TRIGL SERPL-MCNC: 68 MG/DL (ref 0–149)
TSH SERPL DL<=0.005 MIU/L-ACNC: 1.8 UIU/ML (ref 0.45–4.5)
VLDLC SERPL CALC-MCNC: 13 MG/DL (ref 5–40)
WBC # BLD AUTO: 6.6 X10E3/UL (ref 3.4–10.8)

## 2021-01-22 RX ORDER — ATORVASTATIN CALCIUM 40 MG/1
TABLET, FILM COATED ORAL
Qty: 90 TAB | Refills: 3 | Status: SHIPPED | OUTPATIENT
Start: 2021-01-22

## 2021-03-31 ENCOUNTER — IMMUNIZATION (OUTPATIENT)
Dept: INTERNAL MEDICINE CLINIC | Age: 46
End: 2021-03-31
Payer: COMMERCIAL

## 2021-03-31 DIAGNOSIS — Z23 ENCOUNTER FOR IMMUNIZATION: Primary | ICD-10-CM

## 2021-03-31 PROCEDURE — 91300 COVID-19, MRNA, LNP-S, PF, 30MCG/0.3ML DOSE(PFIZER): CPT | Performed by: FAMILY MEDICINE

## 2021-03-31 PROCEDURE — 0001A COVID-19, MRNA, LNP-S, PF, 30MCG/0.3ML DOSE(PFIZER): CPT | Performed by: FAMILY MEDICINE

## 2021-04-21 ENCOUNTER — IMMUNIZATION (OUTPATIENT)
Dept: INTERNAL MEDICINE CLINIC | Age: 46
End: 2021-04-21
Payer: COMMERCIAL

## 2021-04-21 DIAGNOSIS — Z23 ENCOUNTER FOR IMMUNIZATION: Primary | ICD-10-CM

## 2021-04-21 PROCEDURE — 0002A COVID-19, MRNA, LNP-S, PF, 30MCG/0.3ML DOSE(PFIZER): CPT | Performed by: FAMILY MEDICINE

## 2021-04-21 PROCEDURE — 91300 COVID-19, MRNA, LNP-S, PF, 30MCG/0.3ML DOSE(PFIZER): CPT | Performed by: FAMILY MEDICINE

## 2021-05-17 ENCOUNTER — TELEPHONE (OUTPATIENT)
Dept: NEUROLOGY | Age: 46
End: 2021-05-17

## 2021-05-17 NOTE — TELEPHONE ENCOUNTER
Avonex PA sent to Teresa via [employer] [address]  PCN wg  Zeus Arteaga   [de-identified]      Mail order pharmacy for his Avonex is   [facility]   Fax [phone]    Submitted via [address] - then rec'd an immediate response that it may be denied and to call a number for peer to peer. I called that number, voice mail asked to leave all demographics - which I did. Status is pending.

## 2021-05-17 NOTE — TELEPHONE ENCOUNTER
Called pt, re: Avonex - he has been on their free drug program w/ [employer]. We rec'd letter stating they need further info from pt to re-enroll. Pt will call them today at [phone]. Advised to call me if he has any problems w/ obtaining his Avonex.

## 2021-06-18 ENCOUNTER — TELEPHONE (OUTPATIENT)
Dept: NEUROLOGY | Age: 46
End: 2021-06-18

## 2021-06-18 NOTE — TELEPHONE ENCOUNTER
Confirmed patient id and advised that he needs to call for next shipment.  Patient was given phone number after verifying with prior auth specialist.  [phone]

## 2021-06-18 NOTE — TELEPHONE ENCOUNTER
Avonex  Per [facility] - pt was shipped a 30 day supply on 6/8/21 on their free drug program.     Patient can call to coordinate next shipment by calling . Per CM - shows \" a partial approval\"     PA Case: [id number], Status: Partially Approved, Coverage Starts on: 5/18/2021 12:00:00 AM, Coverage Ends on: 5/18/2022 12:00:00 AM.    Called [facility] at [phone] s/w Zach walker/ financial assistance  - Will fax auth letter to [employer] and they will investigate pt's benefits and eligibility. Faxed to [phone].

## 2021-06-23 ENCOUNTER — TELEPHONE (OUTPATIENT)
Dept: NEUROLOGY | Age: 46
End: 2021-06-23

## 2021-06-23 NOTE — TELEPHONE ENCOUNTER
Do not need another denial letter. Will accept letter they received. Need to call the 800 number for financial assistance. Let the patient know [phone].

## 2021-06-25 NOTE — TELEPHONE ENCOUNTER
Called patient and confirmed patient id and advised of need to call the financial aid assistance program for the patients medication avonex. Gave the number and received verbal understanding.

## 2021-08-12 ENCOUNTER — VIRTUAL VISIT (OUTPATIENT)
Dept: FAMILY MEDICINE CLINIC | Age: 46
End: 2021-08-12
Payer: COMMERCIAL

## 2021-08-12 DIAGNOSIS — J06.9 VIRAL URI: ICD-10-CM

## 2021-08-12 PROCEDURE — 99213 OFFICE O/P EST LOW 20 MIN: CPT | Performed by: FAMILY MEDICINE

## 2021-08-12 RX ORDER — CETIRIZINE HCL 10 MG
10 TABLET ORAL
Qty: 30 TABLET | Refills: 6 | COMMUNITY
Start: 2021-08-12

## 2021-08-12 NOTE — PROGRESS NOTES
Chief Complaint   Patient presents with    Cough     Pt was seen via virtual video visit. Pt reports that he has had a cough for several weeks. Pt reports that he has not had CP, SOB, fever, no sinus pressure. Minimal production. Clear to white. Pt has been using Flonase. Cici Kraft is a 50 y.o. male who was seen by synchronous (real-time) audio-video technology on 8/12/2021 for Cough        Assessment & Plan:   Diagnoses and all orders for this visit:    1. Viral URI    Discussed symptomatic treatment, reviewed normal course of viral illnesses. Advised patient to monitor for improvement over the next several days. Encouraged increase fluid and continue nasal spray use. Advised patient to call back to the office if symptoms were not improving in the next 3 to 4 days. Patient agreed    Subjective:       Prior to Admission medications    Medication Sig Start Date End Date Taking? Authorizing Provider   cetirizine (ZyrTEC) 10 mg tablet Take 1 Tablet by mouth daily as needed for Allergies. 8/12/21  Yes Sophie Henao MD   atorvastatin (LIPITOR) 40 mg tablet take 1 tablet by mouth once daily 1/22/21  Yes Sophie Henao MD   Avonex 30 mcg/0.5 mL pnkt INJECT 30 MCG INTRAMUSCULARLY ONCE WEEKLY 9/29/20  Yes Kathy Burnett MD   fluticasone propionate (FLONASE) 50 mcg/actuation nasal spray 2 Sprays by Both Nostrils route daily. Administer to right and left nostrils. 12/26/19  Yes Sophie Henao MD   interferon beta-1a (AVONEX) 30 mcg/0.5 mL injection Inject 30 mcg intramuscularly once a week as directed 8/13/19  Yes Kathy Burnett MD   ibuprofen (MOTRIN) 800 mg tablet Take 1 Tab by mouth every eight (8) hours as needed for Pain. 11/22/16  Yes Abbey Izaguirre MD     No Known Allergies    Review of Systems   Constitutional: Negative for chills, fever and malaise/fatigue. HENT: Positive for congestion. Negative for sore throat. Respiratory: Positive for cough. Negative for shortness of breath. Cardiovascular: Negative for chest pain and palpitations. Gastrointestinal: Negative for abdominal pain, heartburn, nausea and vomiting. Genitourinary: Negative for dysuria and urgency. Musculoskeletal: Negative for joint pain and myalgias. Neurological: Negative for dizziness, tingling and headaches. All other systems reviewed and are negative.       Objective:     Patient-Reported Vitals 9/21/2020   Patient-Reported Weight 290lb        INSTRUCTIONS:  \"_\" Indicates a positive item  \"_\" Indicates a negative item  -- DELETE ALL ITEMS NOT EXAMINED    Constitutional: [x] Appears well-developed and well-nourished [x] No apparent distress      [] Abnormal -     Mental status: [x] Alert and awake  [x] Oriented to person/place/time [x] Able to follow commands    [] Abnormal -     Eyes:   EOM    [x]  Normal    [] Abnormal -   Sclera  [x]  Normal    [] Abnormal -          Discharge [x]  None visible   [] Abnormal -     HENT: [x] Normocephalic, atraumatic  [] Abnormal -   [x] Mouth/Throat: Mucous membranes are moist    External Ears [x] Normal  [] Abnormal -    Neck: [x] No visualized mass [] Abnormal -     Pulmonary/Chest: [x] Respiratory effort normal   [x] No visualized signs of difficulty breathing or respiratory distress        [] Abnormal -      Musculoskeletal:   [x] Normal gait with no signs of ataxia         [x] Normal range of motion of neck        [] Abnormal -     Neurological:        [x] No Facial Asymmetry (Cranial nerve 7 motor function) (limited exam due to video visit)          [x] No gaze palsy        [] Abnormal -          Skin:        [x] No significant exanthematous lesions or discoloration noted on facial skin         [] Abnormal -            Psychiatric:       [x] Normal Affect [] Abnormal -        [x] No Hallucinations    Other pertinent observable physical exam findings:-        We discussed the expected course, resolution and complications of the diagnosis(es) in detail. Medication risks, benefits, costs, interactions, and alternatives were discussed as indicated. I advised him to contact the office if his condition worsens, changes or fails to improve as anticipated. He expressed understanding with the diagnosis(es) and plan. Marquis Jerome IV, was evaluated through a synchronous (real-time) audio-video encounter. The patient (or guardian if applicable) is aware that this is a billable service. Verbal consent to proceed has been obtained within the past 12 months. The visit was conducted pursuant to the emergency declaration under the [address] [place] [address], [address] [place] [address] authority and the [employer] General Act. Patient identification was verified, and a caregiver was present when appropriate. The patient was located in a state where the provider was credentialed to provide care.       Morgan Kruse MD

## 2021-08-23 ENCOUNTER — VIRTUAL VISIT (OUTPATIENT)
Dept: FAMILY MEDICINE CLINIC | Age: 46
End: 2021-08-23
Payer: COMMERCIAL

## 2021-08-23 DIAGNOSIS — R05.9 COUGH: Primary | ICD-10-CM

## 2021-08-23 PROCEDURE — 99213 OFFICE O/P EST LOW 20 MIN: CPT | Performed by: FAMILY MEDICINE

## 2021-08-23 RX ORDER — DOXYCYCLINE 100 MG/1
100 CAPSULE ORAL 2 TIMES DAILY
Qty: 20 CAPSULE | Refills: 0 | Status: SHIPPED | OUTPATIENT
Start: 2021-08-23 | End: 2021-09-02

## 2021-08-23 NOTE — PROGRESS NOTES
1. Have you been to the ER, urgent care clinic since your last visit? Hospitalized since your last visit? No    2. Have you seen or consulted any other health care providers outside of the [address] [place] [address] since your last visit? Include any pap smears or colon screening. No    Health Maintenance Due   Topic Date Due    Hepatitis C Screening  Never done    Colorectal Cancer Screening Combo  Never done     Chief Complaint   Patient presents with    Cough     Pt states he's had cough about 3-4 weeks     Pt states he takes Zyrtec and Flonase to help with his cough. Pt states he was taking Allegra.

## 2021-08-23 NOTE — PROGRESS NOTES
Chief Complaint   Patient presents with    Cough     Pt states he's had cough about 3-4 weeks     Pt was seen via virtual video visit. Pt reports that he has had a cough for 3-4 weeks. Pt thinks it improved by switching from Zyrtec to [place]. Pt reports that cough is dry, very rarely productive. No fever or chills. Chacorta Anthony is a 48 y.o. male who was seen by synchronous (real-time) audio-video technology on 8/23/2021 for Cough (Pt states he's had cough about 3-4 weeks)        Assessment & Plan:   Diagnoses and all orders for this visit:    1. Cough  -     doxycycline (VIBRAMYCIN) 100 mg capsule; Take 1 Capsule by mouth two (2) times a day for 10 days. Advised patient to take medication as written, symptoms are present for 3 to 4 weeks. Patient has been vaccinated against Covid. Patient denies any chills, fever, change in taste or smell or any known sick contacts. Advised patient to follow-up with our office if symptoms or not improving with treatment and care to continue allergy medication while taking antibiotic. Patient agreed    Subjective:       Prior to Admission medications    Medication Sig Start Date End Date Taking? Authorizing Provider   doxycycline (VIBRAMYCIN) 100 mg capsule Take 1 Capsule by mouth two (2) times a day for 10 days. 8/23/21 9/2/21 Yes Natan Henao MD   cetirizine (ZyrTEC) 10 mg tablet Take 1 Tablet by mouth daily as needed for Allergies. 8/12/21  Yes Natan Henao MD   atorvastatin (LIPITOR) 40 mg tablet take 1 tablet by mouth once daily 1/22/21  Yes Natan Henao MD   Avonex 30 mcg/0.5 mL pnkt INJECT 30 MCG INTRAMUSCULARLY ONCE WEEKLY 9/29/20  Yes Felicia Joshua MD   fluticasone propionate (FLONASE) 50 mcg/actuation nasal spray 2 Sprays by Both Nostrils route daily. Administer to right and left nostrils.  12/26/19  Yes Natan Henao MD   interferon beta-1a (AVONEX) 30 mcg/0.5 mL injection Inject 30 mcg intramuscularly once a week as directed 8/13/19  Yes Scot Rivera MD   ibuprofen (MOTRIN) 800 mg tablet Take 1 Tab by mouth every eight (8) hours as needed for Pain. 11/22/16  Yes Christiano Pennington MD         ROS    Objective:     Patient-Reported Vitals 9/21/2020   Patient-Reported Weight 290lb        [INSTRUCTIONS:  \"[x]\" Indicates a positive item  \"[]\" Indicates a negative item  -- DELETE ALL ITEMS NOT EXAMINED]    Constitutional: [x] Appears well-developed and well-nourished [x] No apparent distress      [] Abnormal -     Mental status: [x] Alert and awake  [x] Oriented to person/place/time [x] Able to follow commands    [] Abnormal -     Eyes:   EOM    [x]  Normal    [] Abnormal -   Sclera  [x]  Normal    [] Abnormal -          Discharge [x]  None visible   [] Abnormal -     HENT: [x] Normocephalic, atraumatic  [] Abnormal -   [x] Mouth/Throat: Mucous membranes are moist    External Ears [x] Normal  [] Abnormal -    Neck: [x] No visualized mass [] Abnormal -     Pulmonary/Chest: [x] Respiratory effort normal   [x] No visualized signs of difficulty breathing or respiratory distress        [] Abnormal -      Musculoskeletal:   [x] Normal gait with no signs of ataxia         [x] Normal range of motion of neck        [] Abnormal -     Neurological:        [x] No Facial Asymmetry (Cranial nerve 7 motor function) (limited exam due to video visit)          [x] No gaze palsy        [] Abnormal -          Skin:        [x] No significant exanthematous lesions or discoloration noted on facial skin         [] Abnormal -            Psychiatric:       [x] Normal Affect [] Abnormal -        [x] No Hallucinations    Other pertinent observable physical exam findings:-        We discussed the expected course, resolution and complications of the diagnosis(es) in detail. Medication risks, benefits, costs, interactions, and alternatives were discussed as indicated.   I advised him to contact the office if his condition worsens, changes or fails to improve as anticipated. He expressed understanding with the diagnosis(es) and plan. Hopsonjean-claude Phillips IV, was evaluated through a synchronous (real-time) audio-video encounter. The patient (or guardian if applicable) is aware that this is a billable service. Verbal consent to proceed has been obtained within the past 12 months. The visit was conducted pursuant to the emergency declaration under the [address] [place] [address] and the [employer] Act. Patient identification was verified, and a caregiver was present when appropriate. The patient was located in a state where the provider was credentialed to provide care.       Mariann Segal MD

## 2021-08-30 RX ORDER — INTERFERON BETA-1A 30MCG/.5ML
KIT INTRAMUSCULAR
Qty: 12 KIT | Refills: 3 | Status: SHIPPED | OUTPATIENT
Start: 2021-08-30 | End: 2022-05-31 | Stop reason: SDUPTHER

## 2021-09-16 ENCOUNTER — OFFICE VISIT (OUTPATIENT)
Dept: NEUROLOGY | Age: 46
End: 2021-09-16
Payer: COMMERCIAL

## 2021-09-16 VITALS
BODY MASS INDEX: 40.6 KG/M2 | SYSTOLIC BLOOD PRESSURE: 118 MMHG | TEMPERATURE: 98.3 F | HEIGHT: 71 IN | WEIGHT: 290 LBS | HEART RATE: 107 BPM | DIASTOLIC BLOOD PRESSURE: 82 MMHG | OXYGEN SATURATION: 98 % | RESPIRATION RATE: 17 BRPM

## 2021-09-16 DIAGNOSIS — E78.2 MIXED HYPERLIPIDEMIA: ICD-10-CM

## 2021-09-16 DIAGNOSIS — G35 MULTIPLE SCLEROSIS (HCC): Primary | ICD-10-CM

## 2021-09-16 PROCEDURE — 99214 OFFICE O/P EST MOD 30 MIN: CPT | Performed by: PSYCHIATRY & NEUROLOGY

## 2021-09-16 NOTE — PROGRESS NOTES
Chief Complaint   Patient presents with    Follow-up     follow up on ms.  states that there are no changes that he is fine

## 2021-09-16 NOTE — PROGRESS NOTES
Name: Jorge Roldan IV    Chief Complaint: Multiple sclerosis    He has been doing well. Taking avonex regularly. Has no new weakness or sensory loss. Assesment and Plan  1. Multiple sclerosis  continue avonex  He has been on avonex for CIS apparently had an KINA 2016   He had MRI evidence of several demyelinating lesions. Has 7 bands in the CSF. He has no new weakness or sensory loss    2. Hyperlipidemia  Continue atorvastatin    Allergies  Patient has no known allergies. Medications  Current Outpatient Medications   Medication Sig    Avonex 30 mcg/0.5 mL pnkt INJECT 1 PREFILLED PEN INTRAMUSCULARLY ONCE WEEKLY    cetirizine (ZyrTEC) 10 mg tablet Take 1 Tablet by mouth daily as needed for Allergies.  atorvastatin (LIPITOR) 40 mg tablet take 1 tablet by mouth once daily    fluticasone propionate (FLONASE) 50 mcg/actuation nasal spray 2 Sprays by Both Nostrils route daily. Administer to right and left nostrils.  interferon beta-1a (AVONEX) 30 mcg/0.5 mL injection Inject 30 mcg intramuscularly once a week as directed    ibuprofen (MOTRIN) 800 mg tablet Take 1 Tab by mouth every eight (8) hours as needed for Pain. No current facility-administered medications for this visit. Medical History  Past Medical History:   Diagnosis Date    Clinically isolated syndrome ([facility] [address] 6/30/2015    GERD (gastroesophageal reflux disease)     Muscle weakness     Vertigo      Review of Systems   Eyes: Negative for blurred vision and double vision. Respiratory: Negative for cough and shortness of breath. Cardiovascular: Negative for chest pain, palpitations and orthopnea. Gastrointestinal: Negative for nausea and vomiting. Neurological: Negative for dizziness and headaches. Psychiatric/Behavioral: Negative for depression. The patient is not nervous/anxious.         Exam:  Visit Vitals  /82 (BP 1 Location: Left upper arm, BP Patient Position: Sitting, BP Cuff Size: Adult)   Pulse (!) 107   Temp 98.3 °F (36.8 °C) (Oral)   Resp 17   Ht 5' 11\" (1.803 m)   Wt 290 lb (131.5 kg)   SpO2 98%   BMI 40.45 kg/m²      General: Well developed, well nourished. Patient in no apparent distress   Head: Normocephalic, atraumatic, anicteric sclera   Neck Normal ROM, No thyromegally   Lungs:  Clear to auscultation bilaterally, No wheezes or rubs   Cardiac: Regular rate and rhythm with no murmurs. Abd: Bowel sounds were audible. No tenderness on palpation   Ext: No pedal edema   Skin: Supple no rash     NeurologicExam:  Mental Status: Alert and oriented to person place and time   Speech: Fluent no aphasia or dysarthria. Cranial Nerves:  II - XII Intact   Motor:  Full and symmetric strength of upper and lower proximal and distal muscles. Normal bulk and tone. Reflexes:   Deep tendon reflexes 2+/4 and symmetric. Sensory:   Symmetric and intact with no perceived deficits modalities involving small or large fibers. Gait:  Gait is balanced and fluid with normal arm swing. Tremor:   No tremor noted. Cerebellar:  Coordination intact. Neurovascular: No carotid bruits. No JVD               Imaging  CT Results (most recent):  Results from Hospital Encounter encounter on 06/21/15    CT HEAD WO CONT    Narrative  **Final Report**      ICD Codes / Adm. Diagnosis: 581221  803004 / Dizziness  Double Vision  Examination:  CT HEAD WO CON  - [id number] - Jun 21 2015  3:18PM  Accession No:  [id number]  Reason:  Pain      REPORT:  EXAM:  CT HEAD WO CON    INDICATION:   Pain    COMPARISON: None. TECHNIQUE: Unenhanced CT of the head was performed using 5 mm images. Brain  and bone windows were generated. FINDINGS:  The ventricles and sulci are normal in size, shape and configuration and  midline. There is no significant white matter disease. There is no  intracranial hemorrhage, extra-axial collection, mass, mass effect or  midline shift. The basilar cisterns are open. No acute infarct is  identified.  The bone windows demonstrate no abnormalities. The visualized  portions of the paranasal sinuses and mastoid air cells are clear. Impression  : Normal study          Signing/Reading Doctor: Gretel Middleton ([id number])  Approved: Gretel Middleton ([id number])  Jun 21 2015  3:24PM      MRI Results (most recent):  Results from Hospital Encounter encounter on 06/14/18    MRI BRAIN W WO CONT    Narrative  Clinical indication: Migraines. Technical factors. Diffusion imaging, sagittal T1-weighted pre and post 20 cc  IV. Time axial T1-weighted pre and postcontrast T2 weighted FLAIR gradient echo  coronal T1-weighted postcontrast, comparison May 16, 2017. Diffusion imaging does not show any acute findings. The burden of white matter disease is stable. Ventricle size is also unchanged. There is no enhancing lesion or masses. There is no extra-axial fluid collection  hemorrhage or shift. Impression  impression: Stable burden of white matter disease.         Lab Review  Lab Results   Component Value Date/Time    WBC 6.6 10/01/2020 01:33 PM    HCT 44.4 10/01/2020 01:33 PM    HGB 15.1 10/01/2020 01:33 PM    PLATELET 237 64/54/2539 01:33 PM       Lab Results   Component Value Date/Time    Sodium 140 10/01/2020 01:33 PM    Potassium 3.8 10/01/2020 01:33 PM    Chloride 101 10/01/2020 01:33 PM    CO2 24 10/01/2020 01:33 PM    Glucose 79 10/01/2020 01:33 PM    BUN 13 10/01/2020 01:33 PM    Creatinine 0.78 10/01/2020 01:33 PM    Calcium 9.0 10/01/2020 01:33 PM           Lab Results   Component Value Date/Time    Hemoglobin A1c 5.7 06/21/2015 04:29 PM        Lab Results   Component Value Date/Time    Vitamin B12 332 06/22/2015 02:33 PM           Lab Results   Component Value Date/Time    Cholesterol, total 159 10/01/2020 01:33 PM    HDL Cholesterol 44 10/01/2020 01:33 PM    LDL, calculated 102 (H) 10/01/2020 01:33 PM    LDL, calculated 91 11/22/2016 12:00 AM    VLDL, calculated 13 10/01/2020 01:33 PM    VLDL, calculated 13 11/22/2016 12:00 AM Triglyceride 68 10/01/2020 01:33 PM    CHOL/HDL Ratio 4.5 06/21/2015 04:29 PM

## 2022-01-27 ENCOUNTER — VIRTUAL VISIT (OUTPATIENT)
Dept: FAMILY MEDICINE CLINIC | Age: 47
End: 2022-01-27
Payer: COMMERCIAL

## 2022-01-27 DIAGNOSIS — J06.9 VIRAL URI: Primary | ICD-10-CM

## 2022-01-27 PROCEDURE — 99213 OFFICE O/P EST LOW 20 MIN: CPT | Performed by: FAMILY MEDICINE

## 2022-01-27 RX ORDER — BENZONATATE 200 MG/1
200 CAPSULE ORAL
Qty: 21 CAPSULE | Refills: 1 | Status: SHIPPED | OUTPATIENT
Start: 2022-01-27 | End: 2022-02-03

## 2022-01-27 NOTE — PROGRESS NOTES
THIS VISIT WAS COMPLETED VIRTUALLY USING DOXY. ME    HPI  Jaxon Luis IV is a 48 y.o. male who presents with cough congestion that started on Sunday 2/23. Developed a scratchy throat on Tuesday. This was better the next day and other than a lingering cough as of this morning he feels 100% better. Continues to take Flonase Coricidin and NyQuil    PMHx:  Past Medical History:   Diagnosis Date    Clinically isolated syndrome ([address] 6/30/2015    GERD (gastroesophageal reflux disease)     Muscle weakness     Vertigo        Meds:   Current Outpatient Medications   Medication Sig Dispense Refill    benzonatate (TESSALON) 200 mg capsule Take 1 Capsule by mouth three (3) times daily as needed for Cough for up to 7 days. 21 Capsule 1    Avonex 30 mcg/0.5 mL pnkt INJECT 1 PREFILLED PEN INTRAMUSCULARLY ONCE WEEKLY 12 Kit 3    cetirizine (ZyrTEC) 10 mg tablet Take 1 Tablet by mouth daily as needed for Allergies. 30 Tablet 6    atorvastatin (LIPITOR) 40 mg tablet take 1 tablet by mouth once daily 90 Tab 3    fluticasone propionate (FLONASE) 50 mcg/actuation nasal spray 2 Sprays by Both Nostrils route daily. Administer to right and left nostrils. 1 Bottle 6    interferon beta-1a (AVONEX) 30 mcg/0.5 mL injection Inject 30 mcg intramuscularly once a week as directed 4 Kit 6    ibuprofen (MOTRIN) 800 mg tablet Take 1 Tab by mouth every eight (8) hours as needed for Pain. 30 Tab 0       Allergies:   No Known Allergies    Smoker:  Social History     Tobacco Use   Smoking Status Never Smoker   Smokeless Tobacco Never Used       ETOH:   Social History     Substance and Sexual Activity   Alcohol Use No       FH:   Family History   Problem Relation Age of Onset    Diabetes Mother     Cancer Mother         Breast    Diabetes Father     Hypertension Father        ROS:   As listed in HPI.  In addition:  Constitutional:   No headache, fever, fatigue, weight loss or weight gain      Cardiac:    No chest pain      Resp:   No cough or shortness of breath      Neuro   No loss of consciousness, dizziness, seizures      Physical Exam:  There were no vitals taken for this visit. GEN: No apparent distress. Alert and oriented and responds to all questions appropriately. NEUROLOGIC:  No focal neurologic deficits. Coordination and gait grossly intact. EXT: Well perfused. No edema. SKIN: No obvious rashes. Due to this being a TeleHealth evaluation, many elements of the physical examination are unable to be assessed. Assessment and Plan     Viral URI  Covid is a possibility but at this point swabbing is not going to be useful. He is feeling better. He is on day 5 of what ever illness and based on current CDC recommendations he may return to the world with strict masking for the next 5 days. I explained this reasoning the patient. Tessalon for cough  Honey and warm water for cough        ICD-10-CM ICD-9-CM    1. Viral URI  J06.9 465.9          Pursuant to the emergency declaration under the [facility], [facility] waiver authority and the [employer] and [employer] Act, this Virtual  Visit was conducted, with patient's consent, to reduce the patient's risk of exposure to COVID-19 and provide continuity of care for an established patient. Services were provided through a video synchronous discussion virtually to substitute for in-person clinic visit.

## 2022-01-27 NOTE — PROGRESS NOTES
1. Have you been to the ER, urgent care clinic since your last visit? Hospitalized since your last visit? No    2. Have you seen or consulted any other health care providers outside of the [address] [place] [address] since your last visit? Include any pap smears or colon screening. No    Health Maintenance Due   Topic Date Due    Hepatitis C Screening  Never done    Colorectal Cancer Screening Combo  Never done    Flu Vaccine (1) 09/01/2021    COVID-19 Vaccine (3 - Booster for [place] series) 09/21/2021    Lipid Screen  10/01/2021     Chief Complaint   Patient presents with    Cough     for about 5 days now    Sore Throat     scratchy throat      Pt states he has been taking cough syrup at night and using his Flonase to help with symptoms.

## 2022-03-19 PROBLEM — E66.01 OBESITY, MORBID (HCC): Status: ACTIVE | Noted: 2019-09-23

## 2022-03-20 PROBLEM — G35 MULTIPLE SCLEROSIS (HCC): Status: ACTIVE | Noted: 2019-09-23

## 2022-05-23 ENCOUNTER — TELEPHONE (OUTPATIENT)
Dept: NEUROLOGY | Age: 47
End: 2022-05-23

## 2022-05-23 NOTE — TELEPHONE ENCOUNTER
Patient called to get an updated status faxed over for Avonex 30.  Patient says he has this one every year for financial assistance and to fax it to [phone]

## 2022-05-24 NOTE — TELEPHONE ENCOUNTER
Patient called to give Nurse Maya Mcgrath the number he was given for the prior auth for Avonex.      Phone #: [phone]

## 2022-05-24 NOTE — TELEPHONE ENCOUNTER
I called the patient and asked him to call for; to (do) and have them fax the form that needs to be completed re enroll in the program    He will call them now and call me back in a couple of days to follow up

## 2022-05-27 NOTE — TELEPHONE ENCOUNTER
Janet Da Silva (Cutler: Ale Addison)  Avonex Prefilled 30MCG/0.5ML syringe kit     Form  [place] [facility] Electronic PA Form    Pending

## 2022-05-31 ENCOUNTER — TELEPHONE (OUTPATIENT)
Dept: NEUROLOGY | Age: 47
End: 2022-05-31

## 2022-05-31 RX ORDER — INTERFERON BETA-1A 30MCG/.5ML
KIT INTRAMUSCULAR
Qty: 4 KIT | Refills: 11 | Status: SHIPPED | OUTPATIENT
Start: 2022-05-31

## 2022-05-31 NOTE — TELEPHONE ENCOUNTER
Avonex approval   Case [id number]  Approved for 4 prefilled syringes per every 28 days   Date range 5/30/22 - 5/30/23     They cannot ship 90 day supply. Faxed auth letter to [employer].

## 2022-05-31 NOTE — TELEPHONE ENCOUNTER
Devin Carrion (Cutler: Edward)  Avonex Prefilled 30MCG/0.5ML syringe kit     Form  [place] [facility] Electronic Determination  Status: Partially Approved,   Coverage Starts on: 5/30/2022 12:00:00 AM,   Coverage Ends on: 5/30/2023 12:00:00 AM.    PA Case: [id number]

## 2022-07-06 ENCOUNTER — TELEPHONE (OUTPATIENT)
Dept: FAMILY MEDICINE CLINIC | Age: 47
End: 2022-07-06

## 2022-07-06 NOTE — TELEPHONE ENCOUNTER
Rajwinder TAN/ [facility] [phone]  fax# [phone]    -Recent Office notes  -Recent Labs  -Pt has an appt on Thursday 07/14/2022 @ 9:30 A. M  Please fax over these documents to them before pt's appt    Thank You

## 2022-07-18 ENCOUNTER — TELEPHONE (OUTPATIENT)
Dept: NEUROLOGY | Age: 47
End: 2022-07-18

## 2022-07-18 NOTE — TELEPHONE ENCOUNTER
LISS Bolivar is enrolled in their financial asst program. They need us to complete a PA for his Avonex. Pleaase call with questions.  [phone] ext [phone]

## 2022-07-19 NOTE — TELEPHONE ENCOUNTER
Avonex has been approved  - see my note 5/31/22 -     Noemí Carvajal - I will fax the Charissa Granados details to [facility].

## 2022-07-25 ENCOUNTER — TELEPHONE (OUTPATIENT)
Dept: NEUROLOGY | Age: 47
End: 2022-07-25

## 2022-08-13 NOTE — TELEPHONE ENCOUNTER
Avonex was approved on 5/31/22 and my documentation on that date are as follows: Avonex approval  Case [id number]  Approved for 4 prefilled syringes per every 28 days  Date range 5/30/22 - 5/30/23     They cannot ship 90 day supply. Faxed auth letter to [facility] [employer].  [phone]   Ext [phone]  [facility]) had called recently to check on PA - copy of auth was faxed to them on 5/31/22. Radha Leyva,     Can you call to let them know on Monday? I called today to see if I could leave a message on that extension but it would not give me that prompt.

## 2022-08-15 NOTE — TELEPHONE ENCOUNTER
Spoke with Keshia Jiménez with [employer],  Verified patient with two patient identifiers. Advised Avonex was approved, gave details. State they will withdraw pt from assistance. They will see if he needs help with copays or anything else. States they will reach out to  him. Juanpablo verbalized understanding.

## 2022-10-25 ENCOUNTER — OFFICE VISIT (OUTPATIENT)
Dept: NEUROLOGY | Age: 47
End: 2022-10-25
Payer: COMMERCIAL

## 2022-10-25 VITALS
WEIGHT: 300 LBS | SYSTOLIC BLOOD PRESSURE: 128 MMHG | OXYGEN SATURATION: 97 % | BODY MASS INDEX: 42 KG/M2 | HEIGHT: 71 IN | RESPIRATION RATE: 18 BRPM | TEMPERATURE: 98.1 F | HEART RATE: 88 BPM | DIASTOLIC BLOOD PRESSURE: 88 MMHG

## 2022-10-25 DIAGNOSIS — Z51.81 THERAPEUTIC DRUG MONITORING: ICD-10-CM

## 2022-10-25 DIAGNOSIS — G35 MULTIPLE SCLEROSIS (HCC): Primary | ICD-10-CM

## 2022-10-25 DIAGNOSIS — G37.9 CLINICALLY ISOLATED SYNDROME (HCC): ICD-10-CM

## 2022-10-25 PROCEDURE — 99213 OFFICE O/P EST LOW 20 MIN: CPT | Performed by: PSYCHIATRY & NEUROLOGY

## 2022-10-25 NOTE — LETTER
10/25/2022    Patient: Coleman Araya IV   YOB: 1975   Date of Visit: 10/25/2022     Allison Henao MD  [address] [place] [address] [address] [id number]  [address] [place]    Dear Ayush Root MD,      Thank you for referring Mr. Angelita Silva to [facility] for evaluation. My notes for this consultation are attached. 1. Have you been to the ER, urgent care clinic since your last visit? Hospitalized since your last visit? No.    2. Have you seen or consulted any other health care providers outside of the [address] [place] [address] since your last visit? Include any pap smears or colon screening. No.        Chief Complaint   Patient presents with    Multiple Sclerosis     Seen by  in 2021- pt denies any symptoms         Consult  REFERRED BY:  Allison Henao MD    CHIEF COMPLAINT: Multiple sclerosis      Subjective:     Coleman Araya IV is a 50 y.o. right-handed  male we are seeing as a new patient, at the request of Dr. Christy Melgoza for evaluation of patient having multiple sclerosis, and needing a new neurologist because his previous neurologist has left the . The patient was diagnosed in 2015, with some visual problems, and found to have MS, and has been on Avonex therapy ever since that time with relatively stable MRI scans, but not had one in over 4 years. He has not had any new symptoms in the last year, and actually has no objective deficits as a residual of his disease. He takes his Avonex faithfully and has not had any monitoring blood work recently that will be ordered today and we did talk to him about getting an MRI scan as recommended by the [address] every 2 to 3 years he is going to do that now. Patient has no other major medical problems in the interim also, and gets his Avonex with patient assistance.   Encouraged him to take a multivitamin every day and the vitamin D every day, eat a Mediterranean type diet, it is antioxidant and anti-inflammatory in character, and to try to exercise a little every day and stay mentally active in addition. Past Medical History:   Diagnosis Date    Clinically isolated syndrome ([address] 06/30/2015    GERD (gastroesophageal reflux disease)     Multiple sclerosis (HCC)     Muscle weakness     Vertigo       Past Surgical History:   Procedure Laterality Date    HX CHOLECYSTECTOMY      HX HERNIA REPAIR Right      Family History   Problem Relation Age of Onset    Diabetes Mother     Cancer Mother         Breast    Diabetes Father     Hypertension Father       Social History     Tobacco Use    Smoking status: Never    Smokeless tobacco: Never   Substance Use Topics    Alcohol use: Yes     Comment: every now and then - rarely         Current Outpatient Medications:     interferon beta-1a (Avonex) 30 mcg/0.5 mL pnkt, INJECT 1 PREFILLED PEN INTRAMUSCULARLY ONCE WEEKLY, Disp: 4 Kit, Rfl: 11    cetirizine (ZYRTEC) 10 mg tablet, Take 1 Tablet by mouth daily as needed for Allergies. , Disp: 30 Tablet, Rfl: 6    atorvastatin (LIPITOR) 40 mg tablet, take 1 tablet by mouth once daily, Disp: 90 Tab, Rfl: 3    fluticasone propionate (FLONASE) 50 mcg/actuation nasal spray, 2 Sprays by Both Nostrils route daily. Administer to right and left nostrils. , Disp: 1 Bottle, Rfl: 6    ibuprofen (MOTRIN) 800 mg tablet, Take 1 Tab by mouth every eight (8) hours as needed for Pain., Disp: 30 Tab, Rfl: 0        No Known Allergies   MRI Results (most recent):  Results from Hospital Encounter encounter on 06/14/18    MRI BRAIN W WO CONT    Narrative  Clinical indication: Migraines. Technical factors. Diffusion imaging, sagittal T1-weighted pre and post 20 cc  IV. Time axial T1-weighted pre and postcontrast T2 weighted FLAIR gradient echo  coronal T1-weighted postcontrast, comparison May 16, 2017. Diffusion imaging does not show any acute findings.   The burden of white matter disease is stable. Ventricle size is also unchanged. There is no enhancing lesion or masses. There is no extra-axial fluid collection  hemorrhage or shift. Impression  impression: Stable burden of white matter disease. Results from [place] encounter on 06/14/18    MRI BRAIN W WO CONT    Narrative  Clinical indication: Migraines. Technical factors. Diffusion imaging, sagittal T1-weighted pre and post 20 cc  IV. Time axial T1-weighted pre and postcontrast T2 weighted FLAIR gradient echo  coronal T1-weighted postcontrast, comparison May 16, 2017. Diffusion imaging does not show any acute findings. The burden of white matter disease is stable. Ventricle size is also unchanged. There is no enhancing lesion or masses. There is no extra-axial fluid collection  hemorrhage or shift. Impression  impression: Stable burden of white matter disease. Review of Systems:  A comprehensive review of systems was negative except for: Neurological: positive for history of MS on Avonex    Vitals:    10/25/22 1505   BP: 128/88   Pulse: 88   Resp: 18   Temp: 98.1 °F (36.7 °C)   TempSrc: Temporal   SpO2: 97%   Weight: 300 lb (136.1 kg)   Height: 5' 11\" (1.803 m)     Objective:     I      NEUROLOGICAL EXAM:    Appearance: The patient is well developed, well nourished, mildly overweight provides a coherent history and is in no acute distress. Mental Status: Oriented to time, place and person, and the president, cognitive function is normal and speech is fluent and no aphasia or dysarthria. Mood and affect appropriate. Cranial Nerves:   Intact visual fields. Fundi are benign, disc are flat, no lesions seen on funduscopy. BLAIR, EOM's full, no nystagmus, no ptosis. Facial sensation is normal. Corneal reflexes are not tested. Facial movement is symmetric. Hearing is normal bilaterally. Palate is midline with normal sternocleidomastoid and trapezius muscles are normal. Tongue is midline.   Neck without meningismus or bruits  Temporal arteries are not tender or enlarged  TMJ areas are not tender on palpation   Motor:  5/5 strength in upper and lower proximal and distal muscles. Normal bulk and tone. No fasciculations. Rapid alternating movement is symmetric and intact bilaterally   Reflexes:   Deep tendon reflexes 2+/4 and symmetrical.  No babinski or clonus present   Sensory:   Normal to touch, pinprick and vibration and temperature. DSS is intact   Gait:  Normal gait for patient's age. Tremor:   No tremor noted. Cerebellar:  No abnormal cerebellar signs present on Romberg and tandem testing and finger-nose-finger exam.   Neurovascular:  Normal heart sounds and regular rhythm, peripheral pulses intact, and no carotid bruits. Assessment:       ICD-10-CM ICD-9-CM    1. Multiple sclerosis (HCC)  G35 340 CBC WITH AUTOMATED DIFF      METABOLIC PANEL, COMPREHENSIVE      MRI BRAIN W WO CONT      2. Clinically isolated syndrome (HCC)  G37.9 341.9 CBC WITH AUTOMATED DIFF      METABOLIC PANEL, COMPREHENSIVE      MRI BRAIN W WO CONT      3. Therapeutic drug monitoring  Z51.81 V58.83 CBC WITH AUTOMATED DIFF      METABOLIC PANEL, COMPREHENSIVE      MRI BRAIN W WO CONT        Active Problems:    * No active hospital problems. *      Plan:     Patient with MS, but relatively stable on his Avonex therapy for the last 7 years, but has not had an MRI scan in 4 years, he is agreeable to get the MRI scan today, get his routine medicine monitoring testing of CBC and differential and CMP to make sure his hematologic and metabolic function stays stable. He is encouraged take a multivitamin and vitamin D every day, exercise regularly and stay mentally and physically active and eat a Mediterranean type diet. Will check [facility] for results of this test, will call us immediately if he has any questions.   26 minutes met with the patient, he was seen again in 1 years time or earlier if need be he will call us immediately if he has any symptoms we did advise him that commonly the disease may stay quiescent for a while but can flareup suddenly and he needs to let us know as soon as he has symptoms. Signed By: Sander Alvarez MD     October 25, 2022       CC: Junior Henao MD  FAX: [phone]        If you have questions, please do not hesitate to call me. I look forward to following your patient along with you.       Sincerely,    Sander Alvarez MD

## 2022-10-25 NOTE — PROGRESS NOTES
1. Have you been to the ER, urgent care clinic since your last visit? Hospitalized since your last visit? No.    2. Have you seen or consulted any other health care providers outside of the [address] [place] [address] since your last visit? Include any pap smears or colon screening.    No.        Chief Complaint   Patient presents with    Multiple Sclerosis     Seen by  in 2021- pt denies any symptoms

## 2022-10-25 NOTE — PROGRESS NOTES
Consult  REFERRED BY:  Poornima Henao MD    CHIEF COMPLAINT: Multiple sclerosis      Subjective:     Unknown Call IV is a 50 y.o. right-handed  male we are seeing as a new patient, at the request of Dr. Marifer Flanagan for evaluation of patient having multiple sclerosis, and needing a new neurologist because his previous neurologist has left the CD. The patient was diagnosed in 2015, with some visual problems, and found to have MS, and has been on Avonex therapy ever since that time with relatively stable MRI scans, but not had one in over 4 years. He has not had any new symptoms in the last year, and actually has no objective deficits as a residual of his disease. He takes his Avonex faithfully and has not had any monitoring blood work recently that will be ordered today and we did talk to him about getting an MRI scan as recommended by the [address] every 2 to 3 years he is going to do that now. Patient has no other major medical problems in the interim also, and gets his Avonex with patient assistance. Encouraged him to take a multivitamin every day and the vitamin D every day, eat a Mediterranean type diet, it is antioxidant and anti-inflammatory in character, and to try to exercise a little every day and stay mentally active in addition.     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    GERD (gastroesophageal reflux disease)     Multiple sclerosis (HCC)     Muscle weakness     Vertigo       Past Surgical History:   Procedure Laterality Date    HX CHOLECYSTECTOMY      HX HERNIA REPAIR Right      Family History   Problem Relation Age of Onset    Diabetes Mother     Cancer Mother         Breast    Diabetes Father     Hypertension Father       Social History     Tobacco Use    Smoking status: Never    Smokeless tobacco: Never   Substance Use Topics    Alcohol use: Yes     Comment: every now and then - rarely         Current Outpatient Medications: interferon beta-1a (Avonex) 30 mcg/0.5 mL pnkt, INJECT 1 PREFILLED PEN INTRAMUSCULARLY ONCE WEEKLY, Disp: 4 Kit, Rfl: 11    cetirizine (ZYRTEC) 10 mg tablet, Take 1 Tablet by mouth daily as needed for Allergies. , Disp: 30 Tablet, Rfl: 6    atorvastatin (LIPITOR) 40 mg tablet, take 1 tablet by mouth once daily, Disp: 90 Tab, Rfl: 3    fluticasone propionate (FLONASE) 50 mcg/actuation nasal spray, 2 Sprays by Both Nostrils route daily. Administer to right and left nostrils. , Disp: 1 Bottle, Rfl: 6    ibuprofen (MOTRIN) 800 mg tablet, Take 1 Tab by mouth every eight (8) hours as needed for Pain., Disp: 30 Tab, Rfl: 0        No Known Allergies   MRI Results (most recent):  Results from Hospital Encounter encounter on 06/14/18    MRI BRAIN W WO CONT    Narrative  Clinical indication: Migraines. Technical factors. Diffusion imaging, sagittal T1-weighted pre and post 20 cc  IV. Time axial T1-weighted pre and postcontrast T2 weighted FLAIR gradient echo  coronal T1-weighted postcontrast, comparison May 16, 2017. Diffusion imaging does not show any acute findings. The burden of white matter disease is stable. Ventricle size is also unchanged. There is no enhancing lesion or masses. There is no extra-axial fluid collection  hemorrhage or shift. Impression  impression: Stable burden of white matter disease. Results from [place] encounter on 06/14/18    MRI BRAIN W WO CONT    Narrative  Clinical indication: Migraines. Technical factors. Diffusion imaging, sagittal T1-weighted pre and post 20 cc  IV. Time axial T1-weighted pre and postcontrast T2 weighted FLAIR gradient echo  coronal T1-weighted postcontrast, comparison May 16, 2017. Diffusion imaging does not show any acute findings. The burden of white matter disease is stable. Ventricle size is also unchanged. There is no enhancing lesion or masses. There is no extra-axial fluid collection  hemorrhage or shift.     Impression  impression: Stable burden of white matter disease. Review of Systems:  A comprehensive review of systems was negative except for: Neurological: positive for history of MS on Avonex    Vitals:    10/25/22 1505   BP: 128/88   Pulse: 88   Resp: 18   Temp: 98.1 °F (36.7 °C)   TempSrc: Temporal   SpO2: 97%   Weight: 300 lb (136.1 kg)   Height: 5' 11\" (1.803 m)     Objective:     I      NEUROLOGICAL EXAM:    Appearance: The patient is well developed, well nourished, mildly overweight provides a coherent history and is in no acute distress. Mental Status: Oriented to time, place and person, and the president, cognitive function is normal and speech is fluent and no aphasia or dysarthria. Mood and affect appropriate. Cranial Nerves:   Intact visual fields. Fundi are benign, disc are flat, no lesions seen on funduscopy. BLAIR, EOM's full, no nystagmus, no ptosis. Facial sensation is normal. Corneal reflexes are not tested. Facial movement is symmetric. Hearing is normal bilaterally. Palate is midline with normal sternocleidomastoid and trapezius muscles are normal. Tongue is midline. Neck without meningismus or bruits  Temporal arteries are not tender or enlarged  TMJ areas are not tender on palpation   Motor:  5/5 strength in upper and lower proximal and distal muscles. Normal bulk and tone. No fasciculations. Rapid alternating movement is symmetric and intact bilaterally   Reflexes:   Deep tendon reflexes 2+/4 and symmetrical.  No babinski or clonus present   Sensory:   Normal to touch, pinprick and vibration and temperature. DSS is intact   Gait:  Normal gait for patient's age. Tremor:   No tremor noted. Cerebellar:  No abnormal cerebellar signs present on Romberg and tandem testing and finger-nose-finger exam.   Neurovascular:  Normal heart sounds and regular rhythm, peripheral pulses intact, and no carotid bruits.            Assessment:       ICD-10-CM ICD-9-CM    1. Multiple sclerosis (HCC)  G35 340 CBC WITH AUTOMATED DIFF      METABOLIC PANEL, COMPREHENSIVE      MRI BRAIN W WO CONT      2. Clinically isolated syndrome (HCC)  G37.9 341.9 CBC WITH AUTOMATED DIFF      METABOLIC PANEL, COMPREHENSIVE      MRI BRAIN W WO CONT      3. Therapeutic drug monitoring  Z51.81 V58.83 CBC WITH AUTOMATED DIFF      METABOLIC PANEL, COMPREHENSIVE      MRI BRAIN W WO CONT        Active Problems:    * No active hospital problems. *      Plan:     Patient with MS, but relatively stable on his Avonex therapy for the last 7 years, but has not had an MRI scan in 4 years, he is agreeable to get the MRI scan today, get his routine medicine monitoring testing of CBC and differential and CMP to make sure his hematologic and metabolic function stays stable. He is encouraged take a multivitamin and vitamin D every day, exercise regularly and stay mentally and physically active and eat a Mediterranean type diet. [facility], will call us immediately if he has any questions. 26 minutes met with the patient, he was seen again in 1 years time or earlier if need be he will call us immediately if he has any symptoms we did advise him that commonly the disease may stay quiescent for a while but can flareup suddenly and he needs to let us know as soon as he has symptoms.     Signed By: Maranda Hudson MD     October 25, 2022       CC: Yandel Castellanos MD  FAX: [phone]

## 2022-11-08 ENCOUNTER — HOSPITAL ENCOUNTER (OUTPATIENT)
Dept: MRI IMAGING | Age: 47
Discharge: HOME | End: 2022-11-08
Attending: PSYCHIATRY & NEUROLOGY
Payer: COMMERCIAL

## 2022-11-08 DIAGNOSIS — G37.9 CLINICALLY ISOLATED SYNDROME (HCC): ICD-10-CM

## 2022-11-08 DIAGNOSIS — Z51.81 THERAPEUTIC DRUG MONITORING: ICD-10-CM

## 2022-11-08 DIAGNOSIS — G35 MULTIPLE SCLEROSIS (HCC): ICD-10-CM

## 2022-11-08 PROCEDURE — 70553 MRI BRAIN STEM W/O & W/DYE: CPT

## 2022-11-08 PROCEDURE — A9576 INJ PROHANCE MULTIPACK: HCPCS | Performed by: PSYCHIATRY & NEUROLOGY

## 2022-11-08 PROCEDURE — 74011250636 HC RX REV CODE- 250/636: Performed by: PSYCHIATRY & NEUROLOGY

## 2022-11-08 RX ADMIN — GADOTERIDOL 20 ML: 279.3 INJECTION, SOLUTION INTRAVENOUS at 17:03

## 2022-11-11 ENCOUNTER — DOCUMENTATION ONLY (OUTPATIENT)
Dept: NEUROLOGY | Age: 47
End: 2022-11-11

## 2022-11-11 NOTE — PROGRESS NOTES
I called the patient, informed him his MRI scan was stable, and he said thank you, he will continue his current therapy.

## 2023-01-05 RX ORDER — ATORVASTATIN CALCIUM 40 MG/1
TABLET, FILM COATED ORAL
Qty: 30 TABLET | Refills: 0 | OUTPATIENT
Start: 2023-01-05

## 2023-01-05 NOTE — TELEPHONE ENCOUNTER
Pt is calling requesting a refill on med.  He did make an apt wants to know if he can get enough med until seen    Requested Prescriptions     Refused Prescriptions Disp Refills    atorvastatin (LIPITOR) 40 mg tablet [Pharmacy Med Name: ATORVASTATIN 40 MG TABLET] 30 Tablet 0     Sig: TAKE 1 TABLET BY MOUTH EVERY DAY     Refused By: Branden Bush     Reason for Refusal: Appt required, please call patient

## 2023-01-06 RX ORDER — ATORVASTATIN CALCIUM 40 MG/1
TABLET, FILM COATED ORAL
Qty: 30 TABLET | Refills: 0 | Status: SHIPPED | OUTPATIENT
Start: 2023-01-06

## 2023-01-16 ENCOUNTER — OFFICE VISIT (OUTPATIENT)
Dept: FAMILY MEDICINE CLINIC | Age: 48
End: 2023-01-16
Payer: COMMERCIAL

## 2023-01-16 VITALS
OXYGEN SATURATION: 97 % | BODY MASS INDEX: 40.88 KG/M2 | HEART RATE: 80 BPM | HEIGHT: 71 IN | RESPIRATION RATE: 16 BRPM | WEIGHT: 292 LBS | SYSTOLIC BLOOD PRESSURE: 121 MMHG | DIASTOLIC BLOOD PRESSURE: 82 MMHG

## 2023-01-16 DIAGNOSIS — Z12.11 ENCOUNTER FOR SCREENING COLONOSCOPY: ICD-10-CM

## 2023-01-16 DIAGNOSIS — G35 MULTIPLE SCLEROSIS (HCC): ICD-10-CM

## 2023-01-16 DIAGNOSIS — E66.01 OBESITY, CLASS III, BMI 40-49.9 (MORBID OBESITY) (HCC): ICD-10-CM

## 2023-01-16 DIAGNOSIS — E78.00 HYPERCHOLESTEROLEMIA: ICD-10-CM

## 2023-01-16 DIAGNOSIS — Z23 NEEDS FLU SHOT: Primary | ICD-10-CM

## 2023-01-16 PROCEDURE — 90686 IIV4 VACC NO PRSV 0.5 ML IM: CPT | Performed by: FAMILY MEDICINE

## 2023-01-16 PROCEDURE — 99214 OFFICE O/P EST MOD 30 MIN: CPT | Performed by: FAMILY MEDICINE

## 2023-01-16 PROCEDURE — 90471 IMMUNIZATION ADMIN: CPT | Performed by: FAMILY MEDICINE

## 2023-01-16 RX ORDER — ATORVASTATIN CALCIUM 40 MG/1
40 TABLET, FILM COATED ORAL DAILY
Qty: 90 TABLET | Refills: 3 | Status: SHIPPED | OUTPATIENT
Start: 2023-01-16

## 2023-01-16 NOTE — PROGRESS NOTES
Chief Complaint   Patient presents with    Medication Refill    Immunization/Injection     flu     Pt is followed by Dr. Kimberly Mccullough for MS, stable, no flares, last MRI lat year. Pt received a flu vaccine today. He is fasting today. Pt stay active at work. Pt is scheduled for a sleep study with Zeynep Hartley Subjective: (As above and below)     Chief Complaint   Patient presents with    Medication Refill    Immunization/Injection     flu     he is a 50y.o. year old male who presents for evaluation. Reviewed PmHx, RxHx, FmHx, SocHx, AllgHx and updated in chart. Review of Systems - negative except as listed above    Objective:     Vitals:    01/16/23 0755   BP: 121/82   Pulse: 80   Resp: 16   SpO2: 97%   Weight: 292 lb (132.5 kg)   Height: 5' 11\" (1.803 m)     Physical Examination: General appearance - alert, well appearing, and in no distress  Mental status - normal mood, behavior, speech, dress, motor activity, and thought processes  Ears - bilateral TM's and external ear canals normal  Chest - clear to auscultation, no wheezes, rales or rhonchi, symmetric air entry  Heart - normal rate, regular rhythm, normal S1, S2, no murmurs, rubs, clicks or gallops  Musculoskeletal - no joint tenderness, deformity or swelling  Extremities - peripheral pulses normal, no pedal edema, no clubbing or cyanosis    Assessment/ Plan:   1. Needs flu shot  - INFLUENZA, FLUARIX, FLULAVAL, FLUZONE (AGE 6 MO+), AFLURIA(AGE 3Y+) IM, PF, 0.5 ML    2. Multiple sclerosis ([facility] [address]  -managed by neurology, well controlled    3. Hypercholesterolemia  -check fasting labs  - METABOLIC PANEL, COMPREHENSIVE; Future  - CBC W/O DIFF; Future  - LIPID PANEL; Future  - HEMOGLOBIN A1C WITH EAG; Future  - TSH 3RD GENERATION; Future  - atorvastatin (LIPITOR) 40 mg tablet; Take 1 Tablet by mouth daily. TAKE 1 TABLET BY MOUTH EVERY DAY  Dispense: 90 Tablet; Refill: 3  - HEPATITIS C AB; Future    4.  Obesity, Class III, BMI 40-49.9 (morbid obesity) ([facility]  -increase exercise as able    5. Encounter for screening colonoscopy  - REFERRAL TO GASTROENTEROLOGY       I have discussed the diagnosis with the patient and the intended plan as seen in the above orders. The patient has received an after-visit summary and questions were answered concerning future plans.      Medication Side Effects and Warnings were discussed with patient: yes  Patient Labs were reviewed: yes  Patient Past Records were reviewed:  yes      Edouard Chang M.D.

## 2023-01-16 NOTE — PROGRESS NOTES
Chief Complaint   Patient presents with    Medication Refill    Immunization/Injection     flu       Health Maintenance Due   Topic Date Due    Hepatitis C Screening  Never done    Colorectal Cancer Screening Combo  Never done    COVID-19 Vaccine (3 - Booster for Pfizer series) 06/16/2021    Lipid Screen  10/01/2021    Flu Vaccine (1) 08/01/2022     1. \"Have you been to the ER, urgent care clinic since your last visit? Hospitalized since your last visit? \" No    2. \"Have you seen or consulted any other health care providers outside of the [address] [place] [address] since your last visit? \"Yes, sleep doctor for sleep apnea     3. For patients aged 39-70: Has the patient had a colonoscopy / FIT/ Cologuard? No      If the patient is female:    4. For patients aged 41-77: Has the patient had a mammogram within the past 2 years? NA - based on age or sex      11. For patients aged 21-65: Has the patient had a pap smear?  NA - based on age or sex

## 2023-01-16 NOTE — PATIENT INSTRUCTIONS
Vaccine Information Statement    Influenza (Flu) Vaccine (Inactivated or Recombinant): What You Need to Know    Many vaccine information statements are available in [place] and other languages. See www.immunize.org/vis. Hojas de información sobre vacunas están disponibles en español y en muchos otros idiomas. Visite www.immunize.org/vis. 1. Why get vaccinated? Influenza vaccine can prevent influenza (flu). Flu is a contagious disease that spreads around the [facility] every year, usually between October and May. Anyone can get the flu, but it is more dangerous for some people. Infants and young children, people 72 years and older, pregnant people, and people with certain health conditions or a weakened immune system are at greatest risk of flu complications. Pneumonia, bronchitis, sinus infections, and ear infections are examples of flu-related complications. If you have a medical condition, such as heart disease, cancer, or diabetes, flu can make it worse. Flu can cause fever and chills, sore throat, muscle aches, fatigue, cough, headache, and runny or stuffy nose. Some people may have vomiting and diarrhea, though this is more common in children than adults. In an average year, thousands of people in the [facility] die from flu, and many more are hospitalized. Flu vaccine prevents millions of illnesses and flu-related visits to the doctor each year. 2. Influenza vaccines     CDC recommends everyone 6 months and older get vaccinated every flu season. Children 6 months through 6years of age may need 2 doses during a single flu season. Everyone else needs only 1 dose each flu season. It takes about 2 weeks for protection to develop after vaccination. There are many flu viruses, and they are always changing. Each year a new flu vaccine is made to protect against the influenza viruses believed to be likely to cause disease in the upcoming flu season.  Even when the vaccine doesnt exactly match these viruses, it may still provide some protection. Influenza vaccine does not cause flu. Influenza vaccine may be given at the same time as other vaccines. 3. Talk with your health care provider    Tell your vaccination provider if the person getting the vaccine:  Has had an allergic reaction after a previous dose of influenza vaccine, or has any severe, life-threatening allergies   Has ever had Guillain-Barré Syndrome (also called GBS)    In some cases, your health care provider may decide to postpone influenza vaccination until a future visit. Influenza vaccine can be administered at any time during pregnancy. People who are or will be pregnant during influenza season should receive inactivated influenza vaccine. People with minor illnesses, such as a cold, may be vaccinated. People who are moderately or severely ill should usually wait until they recover before getting influenza vaccine. Your health care provider can give you more information. 4. Risks of a vaccine reaction    Soreness, redness, and swelling where the shot is given, fever, muscle aches, and headache can happen after influenza vaccination. There may be a very small increased risk of Guillain-Barré Syndrome (GBS) after inactivated influenza vaccine (the flu shot). Gloria Reid children who get the flu shot along with pneumococcal vaccine (PCV13) and/or DTaP vaccine at the same time might be slightly more likely to have a seizure caused by fever. Tell your health care provider if a child who is getting flu vaccine has ever had a seizure. People sometimes faint after medical procedures, including vaccination. Tell your provider if you feel dizzy or have vision changes or ringing in the ears. As with any medicine, there is a very remote chance of a vaccine causing a severe allergic reaction, other serious injury, or death. 5. What if there is a serious problem?     An allergic reaction could occur after the vaccinated person leaves the clinic. If you see signs of a severe allergic reaction (hives, swelling of the face and throat, difficulty breathing, a fast heartbeat, dizziness, or weakness), call 9-1-1 and get the person to the nearest hospital.    For other signs that concern you, call your health care provider. Adverse reactions should be reported to the Vaccine Adverse Event Reporting System (VAERS). Your health care provider will usually file this report, or you can do it yourself. Visit the VAERS website at www.vaers. [facility].gov or call 9-868.834.4585. VAERS is only for reporting reactions, and VAERS staff members do not give medical advice. 6. The National Vaccine Injury Compensation Program    The [facility] Vaccine Injury Compensation Program (VICP) is a federal program that was created to compensate people who may have been injured by certain vaccines. Claims regarding alleged injury or death due to vaccination have a time limit for filing, which may be as short as two years. Visit the VICP website at www.[facility].gov/vaccinecompensation or call 1-262.257.4039 to learn about the program and about filing a claim. 7. How can I learn more? Ask your health care provider. Call your local or state health department. Visit the website of the Food and Drug Administration (FDA) for vaccine package inserts and additional information at www.fda.gov/vaccines-blood-biologics/vaccines. Contact the Centers for Disease Control and Prevention (CDC): Call 9-150.312.7585 (1-800-CDC-INFO) or  Visit CDCs influenza website at www.cdc.gov/flu. Vaccine Information Statement   Inactivated Influenza Vaccine   8/6/2021  42 MAGDA Velasquez [id number]-90   Department of Health and Human Services  Centers for Disease Control and Prevention    Office Use Only

## 2023-01-17 LAB
ALBUMIN SERPL-MCNC: 3.8 G/DL (ref 3.5–5)
ALBUMIN/GLOB SERPL: 1.1 (ref 1.1–2.2)
ALP SERPL-CCNC: 80 U/L (ref 45–117)
ALT SERPL-CCNC: 62 U/L (ref 12–78)
ANION GAP SERPL CALC-SCNC: 7 MMOL/L (ref 5–15)
AST SERPL-CCNC: 26 U/L (ref 15–37)
BILIRUB SERPL-MCNC: 0.3 MG/DL (ref 0.2–1)
BUN SERPL-MCNC: 14 MG/DL (ref 6–20)
BUN/CREAT SERPL: 19 (ref 12–20)
CALCIUM SERPL-MCNC: 9.3 MG/DL (ref 8.5–10.1)
CHLORIDE SERPL-SCNC: 106 MMOL/L (ref 97–108)
CHOLEST SERPL-MCNC: 159 MG/DL
CO2 SERPL-SCNC: 27 MMOL/L (ref 21–32)
CREAT SERPL-MCNC: 0.73 MG/DL (ref 0.7–1.3)
ERYTHROCYTE [DISTWIDTH] IN BLOOD BY AUTOMATED COUNT: 11.8 % (ref 11.5–14.5)
EST. AVERAGE GLUCOSE BLD GHB EST-MCNC: 117 MG/DL
GLOBULIN SER CALC-MCNC: 3.5 G/DL (ref 2–4)
GLUCOSE SERPL-MCNC: 108 MG/DL (ref 65–100)
HBA1C MFR BLD: 5.7 % (ref 4–5.6)
HCT VFR BLD AUTO: 49.3 % (ref 36.6–50.3)
HCV AB SERPL QL IA: NONREACTIVE
HDLC SERPL-MCNC: 50 MG/DL
HDLC SERPL: 3.2 (ref 0–5)
HGB BLD-MCNC: 16.1 G/DL (ref 12.1–17)
LDLC SERPL CALC-MCNC: 90.6 MG/DL (ref 0–100)
MCH RBC QN AUTO: 30 PG (ref 26–34)
MCHC RBC AUTO-ENTMCNC: 32.7 G/DL (ref 30–36.5)
MCV RBC AUTO: 91.8 FL (ref 80–99)
NRBC # BLD: 0 K/UL (ref 0–0.01)
NRBC BLD-RTO: 0 PER 100 WBC
PLATELET # BLD AUTO: 204 K/UL (ref 150–400)
PMV BLD AUTO: 10.8 FL (ref 8.9–12.9)
POTASSIUM SERPL-SCNC: 4.4 MMOL/L (ref 3.5–5.1)
PROT SERPL-MCNC: 7.3 G/DL (ref 6.4–8.2)
RBC # BLD AUTO: 5.37 M/UL (ref 4.1–5.7)
SODIUM SERPL-SCNC: 140 MMOL/L (ref 136–145)
TRIGL SERPL-MCNC: 92 MG/DL (ref ?–150)
TSH SERPL DL<=0.05 MIU/L-ACNC: 1.48 UIU/ML (ref 0.36–3.74)
VLDLC SERPL CALC-MCNC: 18.4 MG/DL
WBC # BLD AUTO: 6.4 K/UL (ref 4.1–11.1)

## 2023-02-03 DIAGNOSIS — E78.00 HYPERCHOLESTEROLEMIA: ICD-10-CM

## 2023-02-03 RX ORDER — ATORVASTATIN CALCIUM 40 MG/1
TABLET, FILM COATED ORAL
Qty: 30 TABLET | Refills: 1 | Status: SHIPPED | OUTPATIENT
Start: 2023-02-03

## 2023-02-26 DIAGNOSIS — E78.00 HYPERCHOLESTEROLEMIA: ICD-10-CM

## 2023-02-27 RX ORDER — ATORVASTATIN CALCIUM 40 MG/1
TABLET, FILM COATED ORAL
Qty: 30 TABLET | Refills: 0 | Status: SHIPPED | OUTPATIENT
Start: 2023-02-27

## 2023-02-28 NOTE — TELEPHONE ENCOUNTER
verified. Informed pt of message from provider regarding medication refilled and follow up needed. Pt verified understanding and made a follow up for 3/8/23.

## 2023-03-08 ENCOUNTER — OFFICE VISIT (OUTPATIENT)
Dept: FAMILY MEDICINE CLINIC | Age: 48
End: 2023-03-08
Payer: COMMERCIAL

## 2023-03-08 VITALS
WEIGHT: 301.4 LBS | HEART RATE: 64 BPM | BODY MASS INDEX: 42.19 KG/M2 | SYSTOLIC BLOOD PRESSURE: 119 MMHG | DIASTOLIC BLOOD PRESSURE: 83 MMHG | HEIGHT: 71 IN | RESPIRATION RATE: 16 BRPM | OXYGEN SATURATION: 99 %

## 2023-03-08 DIAGNOSIS — J30.9 ALLERGIC RHINITIS, UNSPECIFIED SEASONALITY, UNSPECIFIED TRIGGER: Primary | ICD-10-CM

## 2023-03-08 DIAGNOSIS — E78.00 HYPERCHOLESTEROLEMIA: ICD-10-CM

## 2023-03-08 PROCEDURE — 99213 OFFICE O/P EST LOW 20 MIN: CPT | Performed by: FAMILY MEDICINE

## 2023-03-08 RX ORDER — MONTELUKAST SODIUM 10 MG/1
10 TABLET ORAL DAILY
Qty: 90 TABLET | Refills: 1 | Status: SHIPPED | OUTPATIENT
Start: 2023-03-08 | End: 2023-03-09 | Stop reason: SDUPTHER

## 2023-03-08 RX ORDER — ATORVASTATIN CALCIUM 40 MG/1
40 TABLET, FILM COATED ORAL DAILY
Qty: 90 TABLET | Refills: 3 | Status: SHIPPED | OUTPATIENT
Start: 2023-03-08 | End: 2023-03-09 | Stop reason: SDUPTHER

## 2023-03-08 NOTE — PROGRESS NOTES
Chief Complaint   Patient presents with    Medication Refill    Cough         Health Maintenance Due   Topic Date Due    Colorectal Cancer Screening Combo  Never done    COVID-19 Vaccine (3 - Booster for [place] series) 06/16/2021       1. \"Have you been to the ER, urgent care clinic since your last visit? Hospitalized since your last visit? \" No    2. \"Have you seen or consulted any other health care providers outside of the [address] [place] [address] since your last visit? \" No     3. For patients aged 39-70: Has the patient had a colonoscopy / FIT/ Cologuard? No pt has one scheduled for may 2023      If the patient is female:    4. For patients aged 41-77: Has the patient had a mammogram within the past 2 years? NA - based on age or sex      11. For patients aged 21-65: Has the patient had a pap smear?  NA - based on age or sex

## 2023-03-08 NOTE — PROGRESS NOTES
Chief Complaint   Patient presents with    Medication Refill    Cough     Colonoscopy scheduled for May of this year. Pt had labs done in January. Needs refill on lipitor. Pt has been having intermittent ongoing cough, using Flonase daily. Subjective: (As above and below)     Chief Complaint   Patient presents with    Medication Refill    Cough     he is a 50y.o. year old male who presents for evaluation. Reviewed PmHx, RxHx, FmHx, SocHx, AllgHx and updated in chart. Review of Systems - negative except as listed above    Objective:     Vitals:    03/08/23 0749   BP: 119/83   Pulse: 64   Resp: 16   SpO2: 99%   Weight: 301 lb 6.4 oz (136.7 kg)   Height: 5' 11\" (1.803 m)     Physical Examination: General appearance - alert, well appearing, and in no distress  Mental status - normal mood, behavior, speech, dress, motor activity, and thought processes  Ears - bilateral TM's and external ear canals normal  Chest - clear to auscultation, no wheezes, rales or rhonchi, symmetric air entry  Heart - normal rate, regular rhythm, normal S1, S2, no murmurs, rubs, clicks or gallops  Musculoskeletal - no joint tenderness, deformity or swelling  Extremities - peripheral pulses normal, no pedal edema, no clubbing or cyanosis    Assessment/ Plan:   1. Hypercholesterolemia  -continue on medication  - atorvastatin (LIPITOR) 40 mg tablet; Take 1 Tablet by mouth daily. Dispense: 90 Tablet; Refill: 3    2. Allergic rhinitis, unspecified seasonality, unspecified trigger  -start Singulair      I have discussed the diagnosis with the patient and the intended plan as seen in the above orders. The patient has received an after-visit summary and questions were answered concerning future plans.      Medication Side Effects and Warnings were discussed with patient: yes  Patient Labs were reviewed: yes  Patient Past Records were reviewed:  yes    Ashlyn Blackburn M.D.

## 2023-04-26 RX ORDER — INTERFERON BETA-1A 30MCG/.5ML
KIT INTRAMUSCULAR
Qty: 12 KIT | Refills: 5 | Status: SHIPPED | OUTPATIENT
Start: 2023-04-26

## 2023-05-20 RX ORDER — FLUTICASONE PROPIONATE 50 MCG
2 SPRAY, SUSPENSION (ML) NASAL DAILY
COMMUNITY
Start: 2019-12-26

## 2023-05-20 RX ORDER — CETIRIZINE HYDROCHLORIDE 10 MG/1
10 TABLET ORAL DAILY PRN
COMMUNITY
Start: 2021-08-12

## 2023-05-20 RX ORDER — ATORVASTATIN CALCIUM 40 MG/1
40 TABLET, FILM COATED ORAL DAILY
COMMUNITY
Start: 2023-03-09

## 2023-05-20 RX ORDER — INTERFERON BETA-1A 30MCG/.5ML
KIT INTRAMUSCULAR
COMMUNITY
Start: 2023-04-26

## 2023-05-20 RX ORDER — MONTELUKAST SODIUM 10 MG/1
10 TABLET ORAL DAILY
COMMUNITY
Start: 2023-03-09

## 2023-05-20 RX ORDER — IBUPROFEN 800 MG/1
800 TABLET ORAL EVERY 8 HOURS PRN
COMMUNITY
Start: 2016-11-22

## 2023-07-21 ENCOUNTER — TELEPHONE (OUTPATIENT)
Age: 48
End: 2023-07-21

## 2023-07-21 NOTE — TELEPHONE ENCOUNTER
A benefits form for Avonex came through my fax. I have uploaded it in [address] and forwarded to Paolo via email (if she needs it).

## 2023-08-08 ENCOUNTER — TELEPHONE (OUTPATIENT)
Age: 48
End: 2023-08-08

## 2023-08-08 DIAGNOSIS — G35 MULTIPLE SCLEROSIS (HCC): Primary | ICD-10-CM

## 2023-08-08 RX ORDER — INTERFERON BETA-1A 30MCG/.5ML
KIT INTRAMUSCULAR
Qty: 4 EACH | Refills: 11 | Status: ACTIVE | OUTPATIENT
Start: 2023-08-08

## 2023-08-14 ENCOUNTER — TELEPHONE (OUTPATIENT)
Age: 48
End: 2023-08-14

## 2023-08-14 NOTE — TELEPHONE ENCOUNTER
verified. Informed pt of message from provider regarding recommendations for swelling. Pt verified understanding and had no further questions.

## 2023-08-14 NOTE — TELEPHONE ENCOUNTER
Pt is calling because yesterday 08/13 he was stung about 3-4 times on the arm by yellow jackets    Pt is requesting an rx to control the minor swelling    [facility] [address]

## 2023-08-18 ENCOUNTER — TELEPHONE (OUTPATIENT)
Age: 48
End: 2023-08-18

## 2023-08-18 NOTE — TELEPHONE ENCOUNTER
Re: Avonex MAXINE Dodge called to ck on [place] status. Advised her of approval and it has been routed to [employer]. Pt was on free drug prior to approval.     María Hairston RN BSN (she/her)  Infusion /Access and   Email: [email or web address]  Mobile: [phone]   Fax: [phone]  www. [email or web address]. [facility] [phone]

## 2023-08-28 ENCOUNTER — TELEPHONE (OUTPATIENT)
Age: 48
End: 2023-08-28

## 2023-08-28 NOTE — TELEPHONE ENCOUNTER
Avonex rx requested to be sent to       [facility], [address] [place] [address] Drive [address] [place] [address]

## 2023-08-29 NOTE — TELEPHONE ENCOUNTER
08/09/2023 [address] [facility]     Date: 08/09/23     Patient receives free drug through [address]. Prescription has been routed there. Please call us with any questions at [phone] opt. 2.        808/09/2023 [address] [facility] Update     Date: 08/09/23     Patient receives free drug through [address]. Prescription has been routed there. Please call us with any questions at [phone] opt.  2.

## 2023-09-06 DIAGNOSIS — G35 MULTIPLE SCLEROSIS (HCC): ICD-10-CM

## 2023-09-06 RX ORDER — INTERFERON BETA-1A 30MCG/.5ML
KIT INTRAMUSCULAR
Qty: 4 EACH | Refills: 11 | Status: ACTIVE | OUTPATIENT
Start: 2023-09-06

## 2023-11-08 ENCOUNTER — OFFICE VISIT (OUTPATIENT)
Age: 48
End: 2023-11-08
Payer: COMMERCIAL

## 2023-11-08 VITALS
OXYGEN SATURATION: 98 % | HEIGHT: 71 IN | BODY MASS INDEX: 42.84 KG/M2 | WEIGHT: 306 LBS | SYSTOLIC BLOOD PRESSURE: 98 MMHG | TEMPERATURE: 98.6 F | RESPIRATION RATE: 16 BRPM | HEART RATE: 103 BPM | DIASTOLIC BLOOD PRESSURE: 66 MMHG

## 2023-11-08 DIAGNOSIS — R73.09 ELEVATED GLUCOSE: ICD-10-CM

## 2023-11-08 DIAGNOSIS — R05.1 ACUTE COUGH: ICD-10-CM

## 2023-11-08 DIAGNOSIS — N39.0 URINARY TRACT INFECTION WITHOUT HEMATURIA, SITE UNSPECIFIED: ICD-10-CM

## 2023-11-08 DIAGNOSIS — R35.0 URINARY FREQUENCY: Primary | ICD-10-CM

## 2023-11-08 LAB
BILIRUBIN, URINE, POC: ABNORMAL
BLOOD URINE, POC: NEGATIVE
GLUCOSE URINE, POC: NEGATIVE
HBA1C MFR BLD: 5.5 %
KETONES, URINE, POC: ABNORMAL
LEUKOCYTE ESTERASE, URINE, POC: NEGATIVE
NITRITE, URINE, POC: NEGATIVE
PH, URINE, POC: 5.5 (ref 4.6–8)
PROTEIN,URINE, POC: 30
SPECIFIC GRAVITY, URINE, POC: 1.03 (ref 1–1.03)
URINALYSIS CLARITY, POC: CLEAR
URINALYSIS COLOR, POC: YELLOW
UROBILINOGEN, POC: ABNORMAL

## 2023-11-08 PROCEDURE — 99213 OFFICE O/P EST LOW 20 MIN: CPT | Performed by: NURSE PRACTITIONER

## 2023-11-08 PROCEDURE — 81002 URINALYSIS NONAUTO W/O SCOPE: CPT | Performed by: NURSE PRACTITIONER

## 2023-11-08 PROCEDURE — 83036 HEMOGLOBIN GLYCOSYLATED A1C: CPT | Performed by: NURSE PRACTITIONER

## 2023-11-08 RX ORDER — BENZONATATE 200 MG/1
200 CAPSULE ORAL 3 TIMES DAILY PRN
Qty: 21 CAPSULE | Refills: 0 | Status: SHIPPED | OUTPATIENT
Start: 2023-11-08 | End: 2023-11-15

## 2023-11-08 RX ORDER — CIPROFLOXACIN 500 MG/1
500 TABLET, FILM COATED ORAL 2 TIMES DAILY
Qty: 14 TABLET | Refills: 0 | Status: SHIPPED | OUTPATIENT
Start: 2023-11-08 | End: 2023-11-15

## 2023-11-08 SDOH — ECONOMIC STABILITY: FOOD INSECURITY: WITHIN THE PAST 12 MONTHS, THE FOOD YOU BOUGHT JUST DIDN'T LAST AND YOU DIDN'T HAVE MONEY TO GET MORE.: NEVER TRUE

## 2023-11-08 SDOH — ECONOMIC STABILITY: FOOD INSECURITY: WITHIN THE PAST 12 MONTHS, YOU WORRIED THAT YOUR FOOD WOULD RUN OUT BEFORE YOU GOT MONEY TO BUY MORE.: NEVER TRUE

## 2023-11-08 SDOH — ECONOMIC STABILITY: INCOME INSECURITY: HOW HARD IS IT FOR YOU TO PAY FOR THE VERY BASICS LIKE FOOD, HOUSING, MEDICAL CARE, AND HEATING?: NOT HARD AT ALL

## 2023-11-08 SDOH — ECONOMIC STABILITY: HOUSING INSECURITY
IN THE LAST 12 MONTHS, WAS THERE A TIME WHEN YOU DID NOT HAVE A STEADY PLACE TO SLEEP OR SLEPT IN A SHELTER (INCLUDING NOW)?: NO

## 2023-11-08 ASSESSMENT — PATIENT HEALTH QUESTIONNAIRE - PHQ9
1. LITTLE INTEREST OR PLEASURE IN DOING THINGS: 0
SUM OF ALL RESPONSES TO PHQ QUESTIONS 1-9: 0
SUM OF ALL RESPONSES TO PHQ QUESTIONS 1-9: 0
SUM OF ALL RESPONSES TO PHQ9 QUESTIONS 1 & 2: 0
SUM OF ALL RESPONSES TO PHQ QUESTIONS 1-9: 0
SUM OF ALL RESPONSES TO PHQ QUESTIONS 1-9: 0
2. FEELING DOWN, DEPRESSED OR HOPELESS: 0

## 2023-11-10 LAB
BACTERIA SPEC CULT: NORMAL
SERVICE CMNT-IMP: NORMAL

## 2023-11-15 ENCOUNTER — OFFICE VISIT (OUTPATIENT)
Age: 48
End: 2023-11-15
Payer: COMMERCIAL

## 2023-11-15 VITALS
TEMPERATURE: 98.7 F | HEART RATE: 80 BPM | WEIGHT: 301.7 LBS | SYSTOLIC BLOOD PRESSURE: 122 MMHG | DIASTOLIC BLOOD PRESSURE: 78 MMHG | RESPIRATION RATE: 16 BRPM | BODY MASS INDEX: 42.24 KG/M2 | OXYGEN SATURATION: 97 % | HEIGHT: 71 IN

## 2023-11-15 DIAGNOSIS — Z51.81 THERAPEUTIC DRUG MONITORING: Primary | ICD-10-CM

## 2023-11-15 DIAGNOSIS — G35 MULTIPLE SCLEROSIS (HCC): ICD-10-CM

## 2023-11-15 DIAGNOSIS — H49.00: ICD-10-CM

## 2023-11-15 PROCEDURE — 99213 OFFICE O/P EST LOW 20 MIN: CPT | Performed by: PSYCHIATRY & NEUROLOGY

## 2023-11-15 RX ORDER — ACETAMINOPHEN 160 MG
TABLET,DISINTEGRATING ORAL
COMMUNITY

## 2023-11-15 RX ORDER — VITAMIN B COMPLEX
1 CAPSULE ORAL DAILY
COMMUNITY

## 2023-11-15 ASSESSMENT — PATIENT HEALTH QUESTIONNAIRE - PHQ9
SUM OF ALL RESPONSES TO PHQ QUESTIONS 1-9: 0
2. FEELING DOWN, DEPRESSED OR HOPELESS: 0
1. LITTLE INTEREST OR PLEASURE IN DOING THINGS: 0
SUM OF ALL RESPONSES TO PHQ QUESTIONS 1-9: 0
SUM OF ALL RESPONSES TO PHQ QUESTIONS 1-9: 0
SUM OF ALL RESPONSES TO PHQ9 QUESTIONS 1 & 2: 0
SUM OF ALL RESPONSES TO PHQ QUESTIONS 1-9: 0

## 2023-11-15 NOTE — PROGRESS NOTES
Consult  REFERRED BY:  Cammie Carson MD    CHIEF COMPLAINT: Follow-up for multiple sclerosis      Subjective:     Francie Cason IV is a 46 y. o.right-handed  male we are seeing at the request of Dr. Hetal Rosenthal for evaluation of patient having multiple sclerosis, and patient is currently on Avonex once a week, doing well, and had an MRI scan last year that showed his disease was stable without active lesions, and he had no new symptoms this year to suggest exacerbations, and has no new medical problems and no new neurologic symptoms of any type. The patient was diagnosed in 2015, with some visual problems, and found to have MS, and has been on Avonex therapy ever since that time with relatively stable MRI scans, but not had one in over 4 years. He has not had any new symptoms in the last year, and actually has no objective deficits as a residual of his disease. He takes his Avonex faithfully and has had routine blood test done by his PCP and they have been stable. Patient has no other major medical problems in the interim also, and gets his Avonex with patient assistance. Encouraged him to take a multivitamin every day and the vitamin D every day, eat a Mediterranean type diet, it is antioxidant and anti-inflammatory in character, and to try to exercise a little every day and stay mentally active in addition.        Past Medical History:   Diagnosis Date    Clinically isolated syndrome ([address]) 06/30/2015    GERD (gastroesophageal reflux disease)     Multiple sclerosis (HCC)     Muscle weakness     Vertigo       Past Surgical History:   Procedure Laterality Date    CHOLECYSTECTOMY      HERNIA REPAIR Right      Family History   Problem Relation Age of Onset    Cancer Mother         Breast    Diabetes Mother     Diabetes Father     Hypertension Father       Social History     Tobacco Use    Smoking status: Never    Smokeless tobacco: Never   Substance Use Topics    Alcohol use: Yes         Current Outpatient

## 2023-12-11 ENCOUNTER — OFFICE VISIT (OUTPATIENT)
Age: 48
End: 2023-12-11
Payer: COMMERCIAL

## 2023-12-11 VITALS
WEIGHT: 309 LBS | DIASTOLIC BLOOD PRESSURE: 79 MMHG | TEMPERATURE: 97.6 F | SYSTOLIC BLOOD PRESSURE: 125 MMHG | BODY MASS INDEX: 43.1 KG/M2 | OXYGEN SATURATION: 94 % | HEART RATE: 82 BPM

## 2023-12-11 DIAGNOSIS — Z12.5 SCREENING FOR PROSTATE CANCER: ICD-10-CM

## 2023-12-11 DIAGNOSIS — R05.1 ACUTE COUGH: ICD-10-CM

## 2023-12-11 DIAGNOSIS — R06.83 SNORING: ICD-10-CM

## 2023-12-11 DIAGNOSIS — G35 MULTIPLE SCLEROSIS (HCC): ICD-10-CM

## 2023-12-11 DIAGNOSIS — Z00.00 ROUTINE GENERAL MEDICAL EXAMINATION AT A HEALTH CARE FACILITY: ICD-10-CM

## 2023-12-11 DIAGNOSIS — R73.09 ELEVATED GLUCOSE: ICD-10-CM

## 2023-12-11 DIAGNOSIS — E78.00 HYPERCHOLESTEROLEMIA: Primary | ICD-10-CM

## 2023-12-11 PROCEDURE — 99396 PREV VISIT EST AGE 40-64: CPT | Performed by: FAMILY MEDICINE

## 2023-12-11 RX ORDER — DOXYCYCLINE HYCLATE 100 MG
100 TABLET ORAL 2 TIMES DAILY
Qty: 14 TABLET | Refills: 0 | Status: SHIPPED | OUTPATIENT
Start: 2023-12-11 | End: 2023-12-18

## 2023-12-11 SDOH — ECONOMIC STABILITY: FOOD INSECURITY: WITHIN THE PAST 12 MONTHS, YOU WORRIED THAT YOUR FOOD WOULD RUN OUT BEFORE YOU GOT MONEY TO BUY MORE.: NEVER TRUE

## 2023-12-11 SDOH — ECONOMIC STABILITY: FOOD INSECURITY: WITHIN THE PAST 12 MONTHS, THE FOOD YOU BOUGHT JUST DIDN'T LAST AND YOU DIDN'T HAVE MONEY TO GET MORE.: NEVER TRUE

## 2023-12-11 SDOH — ECONOMIC STABILITY: INCOME INSECURITY: HOW HARD IS IT FOR YOU TO PAY FOR THE VERY BASICS LIKE FOOD, HOUSING, MEDICAL CARE, AND HEATING?: NOT HARD AT ALL

## 2023-12-11 ASSESSMENT — PATIENT HEALTH QUESTIONNAIRE - PHQ9
SUM OF ALL RESPONSES TO PHQ QUESTIONS 1-9: 0
2. FEELING DOWN, DEPRESSED OR HOPELESS: 0
SUM OF ALL RESPONSES TO PHQ QUESTIONS 1-9: 0
1. LITTLE INTEREST OR PLEASURE IN DOING THINGS: 0
SUM OF ALL RESPONSES TO PHQ QUESTIONS 1-9: 0
SUM OF ALL RESPONSES TO PHQ9 QUESTIONS 1 & 2: 0
SUM OF ALL RESPONSES TO PHQ QUESTIONS 1-9: 0

## 2023-12-12 LAB
25(OH)D3 SERPL-MCNC: 19 NG/ML (ref 30–100)
ALBUMIN SERPL-MCNC: 3.9 G/DL (ref 3.5–5)
ALBUMIN/GLOB SERPL: 1.1 (ref 1.1–2.2)
ALP SERPL-CCNC: 77 U/L (ref 45–117)
ALT SERPL-CCNC: 65 U/L (ref 12–78)
ANION GAP SERPL CALC-SCNC: 5 MMOL/L (ref 5–15)
AST SERPL-CCNC: 27 U/L (ref 15–37)
BASOPHILS # BLD: 0 K/UL (ref 0–0.1)
BASOPHILS NFR BLD: 1 % (ref 0–1)
BILIRUB SERPL-MCNC: 0.4 MG/DL (ref 0.2–1)
BUN SERPL-MCNC: 18 MG/DL (ref 6–20)
BUN/CREAT SERPL: 21 (ref 12–20)
CALCIUM SERPL-MCNC: 9.1 MG/DL (ref 8.5–10.1)
CHLORIDE SERPL-SCNC: 106 MMOL/L (ref 97–108)
CHOLEST SERPL-MCNC: 174 MG/DL
CO2 SERPL-SCNC: 29 MMOL/L (ref 21–32)
CREAT SERPL-MCNC: 0.84 MG/DL (ref 0.7–1.3)
DIFFERENTIAL METHOD BLD: NORMAL
EOSINOPHIL # BLD: 0.1 K/UL (ref 0–0.4)
EOSINOPHIL NFR BLD: 2 % (ref 0–7)
ERYTHROCYTE [DISTWIDTH] IN BLOOD BY AUTOMATED COUNT: 11.9 % (ref 11.5–14.5)
EST. AVERAGE GLUCOSE BLD GHB EST-MCNC: 117 MG/DL
GLOBULIN SER CALC-MCNC: 3.7 G/DL (ref 2–4)
GLUCOSE SERPL-MCNC: 114 MG/DL (ref 65–100)
HBA1C MFR BLD: 5.7 % (ref 4–5.6)
HCT VFR BLD AUTO: 46.5 % (ref 36.6–50.3)
HDLC SERPL-MCNC: 49 MG/DL
HDLC SERPL: 3.6 (ref 0–5)
HGB BLD-MCNC: 15.6 G/DL (ref 12.1–17)
IMM GRANULOCYTES # BLD AUTO: 0 K/UL (ref 0–0.04)
IMM GRANULOCYTES NFR BLD AUTO: 0 % (ref 0–0.5)
LDLC SERPL CALC-MCNC: 104.4 MG/DL (ref 0–100)
LYMPHOCYTES # BLD: 1.3 K/UL (ref 0.8–3.5)
LYMPHOCYTES NFR BLD: 22 % (ref 12–49)
MCH RBC QN AUTO: 30.8 PG (ref 26–34)
MCHC RBC AUTO-ENTMCNC: 33.5 G/DL (ref 30–36.5)
MCV RBC AUTO: 91.7 FL (ref 80–99)
MONOCYTES # BLD: 0.6 K/UL (ref 0–1)
MONOCYTES NFR BLD: 10 % (ref 5–13)
NEUTS SEG # BLD: 3.9 K/UL (ref 1.8–8)
NEUTS SEG NFR BLD: 65 % (ref 32–75)
NRBC # BLD: 0 K/UL (ref 0–0.01)
NRBC BLD-RTO: 0 PER 100 WBC
PLATELET # BLD AUTO: 202 K/UL (ref 150–400)
PMV BLD AUTO: 10.6 FL (ref 8.9–12.9)
POTASSIUM SERPL-SCNC: 4.5 MMOL/L (ref 3.5–5.1)
PROT SERPL-MCNC: 7.6 G/DL (ref 6.4–8.2)
PSA SERPL-MCNC: 0.5 NG/ML (ref 0.01–4)
RBC # BLD AUTO: 5.07 M/UL (ref 4.1–5.7)
SODIUM SERPL-SCNC: 140 MMOL/L (ref 136–145)
TRIGL SERPL-MCNC: 103 MG/DL
TSH SERPL DL<=0.05 MIU/L-ACNC: 1.59 UIU/ML (ref 0.36–3.74)
VLDLC SERPL CALC-MCNC: 20.6 MG/DL
WBC # BLD AUTO: 6 K/UL (ref 4.1–11.1)

## 2024-04-03 ENCOUNTER — OFFICE VISIT (OUTPATIENT)
Age: 49
End: 2024-04-03
Payer: COMMERCIAL

## 2024-04-03 VITALS
HEART RATE: 74 BPM | HEIGHT: 71 IN | TEMPERATURE: 98.6 F | DIASTOLIC BLOOD PRESSURE: 86 MMHG | OXYGEN SATURATION: 94 % | SYSTOLIC BLOOD PRESSURE: 138 MMHG | WEIGHT: 308 LBS | BODY MASS INDEX: 43.12 KG/M2

## 2024-04-03 DIAGNOSIS — R09.81 SINUS CONGESTION: Primary | ICD-10-CM

## 2024-04-03 PROCEDURE — 99213 OFFICE O/P EST LOW 20 MIN: CPT | Performed by: FAMILY MEDICINE

## 2024-04-03 RX ORDER — AZITHROMYCIN 250 MG/1
TABLET, FILM COATED ORAL
Qty: 1 PACKET | Refills: 0 | Status: SHIPPED | OUTPATIENT
Start: 2024-04-03 | End: 2024-04-08

## 2024-04-03 RX ORDER — FLUTICASONE PROPIONATE 50 MCG
1 SPRAY, SUSPENSION (ML) NASAL DAILY
Qty: 32 G | Refills: 1 | Status: SHIPPED | OUTPATIENT
Start: 2024-04-03

## 2024-04-03 ASSESSMENT — PATIENT HEALTH QUESTIONNAIRE - PHQ9
SUM OF ALL RESPONSES TO PHQ QUESTIONS 1-9: 0
SUM OF ALL RESPONSES TO PHQ QUESTIONS 1-9: 0
2. FEELING DOWN, DEPRESSED OR HOPELESS: NOT AT ALL
SUM OF ALL RESPONSES TO PHQ QUESTIONS 1-9: 0
1. LITTLE INTEREST OR PLEASURE IN DOING THINGS: NOT AT ALL
SUM OF ALL RESPONSES TO PHQ QUESTIONS 1-9: 0
SUM OF ALL RESPONSES TO PHQ9 QUESTIONS 1 & 2: 0

## 2024-04-03 NOTE — PROGRESS NOTES
Chief Complaint   Patient presents with    Headache     Sinus      Pt reports that symptoms started last week.     No sick contacts, no fevers.     Minimally productive cough.     +sinus pressure  +sinus headaches.     Subjective: (As above and below)     Chief Complaint   Patient presents with    Headache     Sinus      he is a 48 y.o. year old male who presents for evaluation.    Reviewed PmHx, RxHx, FmHx, SocHx, AllgHx and updated in chart.    Review of Systems - negative except as listed above    Objective:     Vitals:    04/03/24 0821   BP: 138/86   Site: Left Upper Arm   Position: Sitting   Cuff Size: Large Adult   Pulse: 74   Temp: 98.6 °F (37 °C)   TempSrc: Temporal   SpO2: 94%   Weight: (!) 139.7 kg (308 lb)   Height: 1.803 m (5' 11\")     Physical Examination: General appearance - alert, well appearing, and in no distress  Mental status - normal mood, behavior, speech, dress, motor activity, and thought processes  Ears - bilateral TM's and external ear canals normal  Nose - mucosal congestion and sinus tenderness noted bilaterally  Mouth - mucous membranes moist, pharynx normal without lesions  Chest - clear to auscultation, no wheezes, rales or rhonchi, symmetric air entry  Heart - normal rate, regular rhythm, normal S1, S2, no murmurs, rubs, clicks or gallops  Musculoskeletal - no joint tenderness, deformity or swelling  Extremities - peripheral pulses normal, no pedal edema, no clubbing or cyanosis    Assessment/ Plan:   1. Sinus congestion  -take medication as written, zpak and flonase  - azithromycin (ZITHROMAX) 250 MG tablet; Take 2 tabs (500 mg) on Day 1, and take 1 tab (250 mg) on days 2 through 5.  Dispense: 1 packet; Refill: 0       Return if symptoms worsen or fail to improve.    I have discussed the diagnosis with the patient and the intended plan as seen in the above orders.  The patient has received an after-visit summary and questions were answered concerning future plans.     Medication Side

## 2024-04-03 NOTE — PROGRESS NOTES
Chief Complaint   Patient presents with    Headache     Sinus          Health Maintenance Due   Topic Date Due    Shingles vaccine (1 of 2) Never done    COVID-19 Vaccine (3 - 2023-24 season) 09/01/2023         \"Have you been to the ER, urgent care clinic since your last visit?  Hospitalized since your last visit?\"    NO    “Have you seen or consulted any other health care providers outside of [facility] since your last visit?”    NO

## 2024-04-27 DIAGNOSIS — E78.00 PURE HYPERCHOLESTEROLEMIA, UNSPECIFIED: ICD-10-CM

## 2024-04-28 RX ORDER — ATORVASTATIN CALCIUM 40 MG/1
40 TABLET, FILM COATED ORAL DAILY
Qty: 90 TABLET | Refills: 3 | Status: SHIPPED | OUTPATIENT
Start: 2024-04-28

## 2024-07-31 ENCOUNTER — TELEPHONE (OUTPATIENT)
Age: 49
End: 2024-07-31

## 2024-08-01 ENCOUNTER — TELEMEDICINE (OUTPATIENT)
Age: 49
End: 2024-08-01
Payer: COMMERCIAL

## 2024-08-01 DIAGNOSIS — R09.81 SINUS CONGESTION: Primary | ICD-10-CM

## 2024-08-01 PROCEDURE — 99443 PR PHYS/QHP TELEPHONE EVALUATION 21-30 MIN: CPT | Performed by: FAMILY MEDICINE

## 2024-08-01 RX ORDER — BENZONATATE 200 MG/1
200 CAPSULE ORAL 3 TIMES DAILY PRN
Qty: 30 CAPSULE | Refills: 0 | Status: SHIPPED | OUTPATIENT
Start: 2024-08-01 | End: 2024-08-11

## 2024-08-01 NOTE — PROGRESS NOTES
Chief Complaint   Patient presents with    Cough         Health Maintenance Due   Topic Date Due    Shingles vaccine (1 of 2) Never done    COVID-19 Vaccine (3 - 2023-24 season) 09/01/2023    Flu vaccine (1) 08/01/2024         \"Have you been to the ER, urgent care clinic since your last visit?  Hospitalized since your last visit?\"    NO    “Have you seen or consulted any other health care providers outside of [facility] since your last visit?”    NO

## 2024-08-01 NOTE — PROGRESS NOTES
Corey Simon IV is a 48 y.o. male evaluated via audio only technology on 8/1/2024.      I communicated with the patient and/or health care decision maker about the nature and details of the following:  Assessment & Plan:   Sinus congestion  URI versus allergies  He tried taking a Zyrtec.  Okay to continue that but needs to be taken daily for to work  Continue Flonase.  Try taking it twice daily  Try taking DayQuil more consistently.  This so far appears to be his most effective medication.  Start taking every 4 hours as directed    Hydrate  Have patience.  If this is a URI would be expected to last 10 days or so    12  Subjective:   Corey Simon IV is a 48 y.o. male who was seen for Cough      Presents with a concern about a cough.  Has been going on since the weekend (4 days.  No known sick contacts.  Thought might be because he did a lot of mowing and inhaled a lot of grass pollen.  He has found DayQuil helpful but he only takes it at night.  He has found Flonase helpful but he only takes it at night.  Once he takes DayQuil and Flonase he does not do any more coughing.  Wakes up the next morning and does some coughing during the day and has some mild sinus congestion symmetric bilaterally    Prior to Admission medications    Medication Sig Start Date End Date Taking? Authorizing Provider   benzonatate (TESSALON) 200 MG capsule Take 1 capsule by mouth 3 times daily as needed for Cough 8/1/24 8/11/24 Yes Ashwin Dobson MD   atorvastatin (LIPITOR) 40 MG tablet TAKE 1 TABLET BY MOUTH EVERY DAY 4/28/24  Yes Julieta Carson MD   fluticasone (FLONASE) 50 MCG/ACT nasal spray 1 spray by Each Nostril route daily 4/3/24  Yes Julieta Carson MD   Cholecalciferol (VITAMIN D3) 50 MCG (2000 UT) CAPS Take by mouth   Yes ProviderMaile MD   b complex vitamins capsule Take 1 capsule by mouth daily   Yes Maile Mcdowell MD   Interferon Beta-1a (AVONEX PEN) 30 MCG/0.5ML AJKT INJECT 30 MCG INTRAMUSCULARLY ONCE

## 2024-08-07 DIAGNOSIS — G35 MULTIPLE SCLEROSIS (HCC): ICD-10-CM

## 2024-08-07 RX ORDER — INTERFERON BETA-1A 30MCG/.5ML
KIT INTRAMUSCULAR
Qty: 12 EACH | Refills: 4 | Status: ACTIVE | OUTPATIENT
Start: 2024-08-07

## 2024-08-14 NOTE — TELEPHONE ENCOUNTER
Addended by: KAELA FISHMAN on: 8/14/2024 04:48 PM     Modules accepted: Orders     Pt is requesting a cough syrup    Pt stated he started having a very persistent cough since 07/29    Pt stated he was taking OTC Dayquil, tylenol and zrytec but it is not helping     [facility] [address] [facility]

## 2024-08-19 NOTE — TELEPHONE ENCOUNTER
Nikolay sharma/ [employer] called for an update on the PA status for Pt's Avonex. Pt informed them that thy are running low on medication and will need it refilled soon. Please advise.    [phone]
Note to Dr. Clarisa Holstein - please escribe to [facility] and I will notify Patkathleene Men to submit the PA renewal. Avonex last written 4/26/23    Previous auth details: Avonex approval  Case [id number]  Approved for 4 prefilled syringes per every 28 days  Date range 5/30/22 - 5/30/23     They cannot ship 90 day supply. Faxed auth letter to [employer].
No Vaccines Administered.

## 2024-08-22 ENCOUNTER — OFFICE VISIT (OUTPATIENT)
Age: 49
End: 2024-08-22
Payer: COMMERCIAL

## 2024-08-22 VITALS
DIASTOLIC BLOOD PRESSURE: 84 MMHG | WEIGHT: 315 LBS | OXYGEN SATURATION: 96 % | BODY MASS INDEX: 44.1 KG/M2 | SYSTOLIC BLOOD PRESSURE: 132 MMHG | HEART RATE: 88 BPM | TEMPERATURE: 98.5 F | HEIGHT: 71 IN

## 2024-08-22 DIAGNOSIS — J30.1 SEASONAL ALLERGIC RHINITIS DUE TO POLLEN: ICD-10-CM

## 2024-08-22 DIAGNOSIS — R05.2 SUBACUTE COUGH: Primary | ICD-10-CM

## 2024-08-22 PROCEDURE — 99213 OFFICE O/P EST LOW 20 MIN: CPT | Performed by: PHYSICIAN ASSISTANT

## 2024-08-22 RX ORDER — MONTELUKAST SODIUM 10 MG/1
10 TABLET ORAL NIGHTLY
Qty: 90 TABLET | Refills: 0 | Status: SHIPPED | OUTPATIENT
Start: 2024-08-22

## 2024-08-22 ASSESSMENT — PATIENT HEALTH QUESTIONNAIRE - PHQ9
SUM OF ALL RESPONSES TO PHQ QUESTIONS 1-9: 0
1. LITTLE INTEREST OR PLEASURE IN DOING THINGS: NOT AT ALL
2. FEELING DOWN, DEPRESSED OR HOPELESS: NOT AT ALL
SUM OF ALL RESPONSES TO PHQ9 QUESTIONS 1 & 2: 0
SUM OF ALL RESPONSES TO PHQ QUESTIONS 1-9: 0

## 2024-08-22 NOTE — PROGRESS NOTES
Subjective:   Corey Simon IV is a 49 y.o. male who complains of productive cough of white mucus for 4 weeks, gradually worsening since that time.   He has intermittent nasal congestion due to seasonal allergies. No sneezing or ST.  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 Tried OTC cold remedies (Nyquil, cough drops) with temporary relief.    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 REFRIGERATE. PROTECT FROM LIGHT. ALLOW TO WARM TO ROOM TEMPERATURE PRIOR TO USE. 12 each 4    atorvastatin (LIPITOR) 40 MG tablet TAKE 1 TABLET BY MOUTH EVERY DAY 90 tablet 3    fluticasone (FLONASE) 50 MCG/ACT nasal spray 1 spray by Each Nostril route daily 32 g 1    Cholecalciferol (VITAMIN D3) 50 MCG (2000 UT) CAPS Take by mouth      b complex vitamins capsule Take 1 capsule by mouth daily      ibuprofen (ADVIL;MOTRIN) 800 MG tablet Take 1 tablet by mouth every 8 hours as needed       No current facility-administered

## 2024-08-22 NOTE — PATIENT INSTRUCTIONS
Stay on the Flonase, 2 sprays each nostril daily.   Remember to use opposite hand to opposite nostril and aim towards the eye on the same side of the face.  Do one spray in each nostril first, then wait a few minutes to give it time to absorb, and then do the 2nd spray in each nostril    Start Allegra once daily in the mornings.    Take the new prescription, montelukast (generic for Singulair) 10 mg once each night.

## 2024-08-22 NOTE — PROGRESS NOTES
Corey Simon IV is a 49 y.o. male     Chief Complaint   Patient presents with    Cough     Persistent with slight amount phelm       \"Have you been to the ER, urgent care clinic since your last visit?  Hospitalized since your last visit?\"    NO    “Have you seen or consulted any other health care providers outside of [facility] since your last visit?”    NO

## 2024-10-14 ENCOUNTER — TELEPHONE (OUTPATIENT)
Age: 49
End: 2024-10-14

## 2024-10-14 NOTE — TELEPHONE ENCOUNTER
----- Message from Art D sent at 10/14/2024  1:15 PM EDT -----  Regarding: ECC Escalation To Practice  ECC Escalation To Practice      Type of Escalation: Acute Care Symptom  --------------------------------------------------------------------------------------------------------------------------    Information for Provider:  Patient is looking for appointment for: Symptom patient exp foot pain  Reasons for Message: Unable to reach practice     Additional Information: exp foot pain for about 3 weeks  --------------------------------------------------------------------------------------------------------------------------    Relationship to Patient: Self     Call Back Info: OK to leave message on voicemail  Preferred Call Back Number: Phone

## 2024-10-16 ENCOUNTER — OFFICE VISIT (OUTPATIENT)
Age: 49
End: 2024-10-16
Payer: COMMERCIAL

## 2024-10-16 ENCOUNTER — HOSPITAL ENCOUNTER (OUTPATIENT)
Facility: HOSPITAL | Age: 49
Discharge: HOME | End: 2024-10-19
Payer: COMMERCIAL

## 2024-10-16 VITALS
DIASTOLIC BLOOD PRESSURE: 87 MMHG | BODY MASS INDEX: 43.24 KG/M2 | HEART RATE: 81 BPM | OXYGEN SATURATION: 94 % | SYSTOLIC BLOOD PRESSURE: 126 MMHG | WEIGHT: 310 LBS | TEMPERATURE: 98.6 F

## 2024-10-16 DIAGNOSIS — M79.671 RIGHT FOOT PAIN: ICD-10-CM

## 2024-10-16 DIAGNOSIS — M79.671 RIGHT FOOT PAIN: Primary | ICD-10-CM

## 2024-10-16 DIAGNOSIS — G35 MULTIPLE SCLEROSIS (HCC): ICD-10-CM

## 2024-10-16 PROCEDURE — 99214 OFFICE O/P EST MOD 30 MIN: CPT | Performed by: FAMILY MEDICINE

## 2024-10-16 PROCEDURE — 73630 X-RAY EXAM OF FOOT: CPT

## 2024-10-16 RX ORDER — PREDNISONE 20 MG/1
20 TABLET ORAL 2 TIMES DAILY
Qty: 10 TABLET | Refills: 0 | Status: SHIPPED | OUTPATIENT
Start: 2024-10-16 | End: 2024-10-21

## 2024-10-16 ASSESSMENT — PATIENT HEALTH QUESTIONNAIRE - PHQ9
SUM OF ALL RESPONSES TO PHQ QUESTIONS 1-9: 0
SUM OF ALL RESPONSES TO PHQ9 QUESTIONS 1 & 2: 0
1. LITTLE INTEREST OR PLEASURE IN DOING THINGS: NOT AT ALL
2. FEELING DOWN, DEPRESSED OR HOPELESS: NOT AT ALL
SUM OF ALL RESPONSES TO PHQ QUESTIONS 1-9: 0

## 2024-10-16 NOTE — PROGRESS NOTES
Chief Complaint   Patient presents with    Foot Pain     For 3 weeks. Right foot      Pt reports that he has been having pain across the top of his right foot for 3 weeks.     Pt initially thought it was due to his shoes being worn out, got new shoes, pain persisted.     Pt started wearing a compression sock with some relief in pain.     Unsure if foot has been red, possibly warm.     Subjective: (As above and below)     Chief Complaint   Patient presents with    Foot Pain     For 3 weeks. Right foot      he is a 49 y.o. year old male who presents for evaluation.    Reviewed PmHx, RxHx, FmHx, SocHx, AllgHx and updated in chart.    Review of Systems - negative except as listed above    Objective:     Vitals:    10/16/24 1159   BP: 126/87   Site: Left Upper Arm   Position: Sitting   Cuff Size: Large Adult   Pulse: 81   Temp: 98.6 °F (37 °C)   TempSrc: Temporal   SpO2: 94%   Weight: (!) 140.6 kg (310 lb)     Physical Examination: General appearance - alert, well appearing, and in no distress  Mental status - normal mood, behavior, speech, dress, motor activity, and thought processes  Chest - clear to auscultation, no wheezes, rales or rhonchi, symmetric air entry  Heart - normal rate, regular rhythm, normal S1, S2, no murmurs, rubs, clicks or gallops  Musculoskeletal - slight warmth and erythema across the top of the right foot, tender along lateral edge, normal ROM, skin intact  Extremities - peripheral pulses normal, no pedal edema, no clubbing or cyanosis    Assessment/ Plan:   1. Right foot pain  -check imaging, concern for stress fracture vs gout vs strain of foot  - Uric Acid; Future  - Comprehensive Metabolic Panel; Future  - XR FOOT RIGHT (MIN 3 VIEWS); Future  - predniSONE (DELTASONE) 20 MG tablet; Take 1 tablet by mouth 2 times daily for 5 days  Dispense: 10 tablet; Refill: 0  - Comprehensive Metabolic Panel  - Uric Acid    2. Multiple sclerosis (HCC)  -stable       Return in about 3 months (around

## 2024-10-16 NOTE — PROGRESS NOTES
Chief Complaint   Patient presents with    Foot Pain     For 3 weeks. Right foot          Health Maintenance Due   Topic Date Due    Shingles vaccine (1 of 2) Never done    Flu vaccine (1) 08/01/2024    COVID-19 Vaccine (3 - 2023-24 season) 09/01/2024         \"Have you been to the ER, urgent care clinic since your last visit?  Hospitalized since your last visit?\"    NO    “Have you seen or consulted any other health care providers outside of [facility] since your last visit?”    NO

## 2024-10-17 LAB
ALBUMIN SERPL-MCNC: 4.2 G/DL (ref 3.8–4.9)
ALP SERPL-CCNC: 82 IU/L (ref 44–121)
ALT SERPL-CCNC: 50 IU/L (ref 0–44)
AST SERPL-CCNC: 25 IU/L (ref 0–40)
BILIRUB SERPL-MCNC: 0.3 MG/DL (ref 0–1.2)
BUN SERPL-MCNC: 19 MG/DL (ref 6–24)
BUN/CREAT SERPL: 24 (ref 9–20)
CALCIUM SERPL-MCNC: 9.1 MG/DL (ref 8.7–10.2)
CHLORIDE SERPL-SCNC: 102 MMOL/L (ref 96–106)
CO2 SERPL-SCNC: 24 MMOL/L (ref 20–29)
CREAT SERPL-MCNC: 0.78 MG/DL (ref 0.76–1.27)
EGFRCR SERPLBLD CKD-EPI 2021: 108 ML/MIN/1.73
GLOBULIN SER CALC-MCNC: 3 G/DL (ref 1.5–4.5)
GLUCOSE SERPL-MCNC: 80 MG/DL (ref 70–99)
POTASSIUM SERPL-SCNC: 4.5 MMOL/L (ref 3.5–5.2)
PROT SERPL-MCNC: 7.2 G/DL (ref 6–8.5)
SODIUM SERPL-SCNC: 141 MMOL/L (ref 134–144)
URATE SERPL-MCNC: 5.8 MG/DL (ref 3.8–8.4)

## 2024-10-31 ENCOUNTER — TELEPHONE (OUTPATIENT)
Age: 49
End: 2024-10-31

## 2024-10-31 RX ORDER — INDOMETHACIN 50 MG/1
50 CAPSULE ORAL 2 TIMES DAILY WITH MEALS
Qty: 60 CAPSULE | Refills: 3 | Status: SHIPPED | OUTPATIENT
Start: 2024-10-31 | End: 2025-02-28

## 2024-11-18 ENCOUNTER — OFFICE VISIT (OUTPATIENT)
Age: 49
End: 2024-11-18
Payer: COMMERCIAL

## 2024-11-18 VITALS
HEIGHT: 71 IN | HEART RATE: 90 BPM | BODY MASS INDEX: 44.1 KG/M2 | WEIGHT: 315 LBS | TEMPERATURE: 97.8 F | OXYGEN SATURATION: 96 % | RESPIRATION RATE: 18 BRPM | DIASTOLIC BLOOD PRESSURE: 74 MMHG | SYSTOLIC BLOOD PRESSURE: 118 MMHG

## 2024-11-18 DIAGNOSIS — H50.9 STRABISMUS: ICD-10-CM

## 2024-11-18 DIAGNOSIS — G35 MULTIPLE SCLEROSIS (HCC): Primary | ICD-10-CM

## 2024-11-18 PROCEDURE — 99213 OFFICE O/P EST LOW 20 MIN: CPT | Performed by: PSYCHIATRY & NEUROLOGY

## 2024-11-18 ASSESSMENT — PATIENT HEALTH QUESTIONNAIRE - PHQ9
SUM OF ALL RESPONSES TO PHQ QUESTIONS 1-9: 0
SUM OF ALL RESPONSES TO PHQ9 QUESTIONS 1 & 2: 0
1. LITTLE INTEREST OR PLEASURE IN DOING THINGS: NOT AT ALL
SUM OF ALL RESPONSES TO PHQ QUESTIONS 1-9: 0
2. FEELING DOWN, DEPRESSED OR HOPELESS: NOT AT ALL
SUM OF ALL RESPONSES TO PHQ QUESTIONS 1-9: 0
SUM OF ALL RESPONSES TO PHQ QUESTIONS 1-9: 0

## 2024-11-18 NOTE — PROGRESS NOTES
History     Tobacco Use    Smoking status: Never     Passive exposure: Never    Smokeless tobacco: Never   Substance Use Topics    Alcohol use: Yes         Current Outpatient Medications:     indomethacin (INDOCIN) 50 MG capsule, Take 1 capsule by mouth 2 times daily (with meals), Disp: 60 capsule, Rfl: 3    montelukast (SINGULAIR) 10 MG tablet, Take 1 tablet by mouth nightly, Disp: 90 tablet, Rfl: 0    Interferon Beta-1a (AVONEX PEN) 30 MCG/0.5ML AJKT, INJECT ONE PEN INTRAMUSCULARLY ONCE WEEKLY. REFRIGERATE. PROTECT FROM LIGHT. ALLOW TO WARM TO ROOM TEMPERATURE PRIOR TO USE., Disp: 12 each, Rfl: 4    atorvastatin (LIPITOR) 40 MG tablet, TAKE 1 TABLET BY MOUTH EVERY DAY, Disp: 90 tablet, Rfl: 3    fluticasone (FLONASE) 50 MCG/ACT nasal spray, 1 spray by Each Nostril route daily, Disp: 32 g, Rfl: 1    Cholecalciferol (VITAMIN D3) 50 MCG (2000 UT) CAPS, Take by mouth, Disp: , Rfl:     b complex vitamins capsule, Take 1 capsule by mouth daily, Disp: , Rfl:         Allergies   Allergen Reactions    Dust Mite Extract       MRI Results (most recent):  @[facility]([id number]:1)@    @[facility]([id number]:1)@  Review of Systems:  A comprehensive review of systems was negative except for: Neurological: positive for history of MS    Vitals:    11/18/24 1452 11/18/24 1523   BP: 118/74    Site: Left Upper Arm    Position: Sitting    Cuff Size: Large Adult    Pulse: (!) 108 90   Resp: 18    Temp: 97.8 °F (36.6 °C)    TempSrc: Temporal    SpO2: 96%    Weight: (!) 143.2 kg (315 lb 9.6 oz)    Height: 1.803 m (5' 11\")      Objective:     I      NEUROLOGICAL EXAM:    Appearance:  The patient is well developed, well nourished, mildly overweight, provides a coherent history and is in no acute distress.   Mental Status: Oriented to time, place and person, and the president, cognitive function is normal and speech is fluent and no aphasia or dysarthria. Mood and affect appropriate.   Cranial Nerves:   Intact visual fields. Fundi are

## 2025-02-25 ENCOUNTER — OFFICE VISIT (OUTPATIENT)
Age: 50
End: 2025-02-25
Payer: COMMERCIAL

## 2025-02-25 VITALS
RESPIRATION RATE: 16 BRPM | WEIGHT: 315 LBS | SYSTOLIC BLOOD PRESSURE: 122 MMHG | DIASTOLIC BLOOD PRESSURE: 85 MMHG | TEMPERATURE: 97.2 F | HEART RATE: 85 BPM | HEIGHT: 71 IN | BODY MASS INDEX: 44.1 KG/M2 | OXYGEN SATURATION: 95 %

## 2025-02-25 DIAGNOSIS — R09.81 NASAL CONGESTION: Primary | ICD-10-CM

## 2025-02-25 DIAGNOSIS — J06.9 VIRAL URI: ICD-10-CM

## 2025-02-25 DIAGNOSIS — S80.211A ABRASION OF RIGHT KNEE, INITIAL ENCOUNTER: ICD-10-CM

## 2025-02-25 PROCEDURE — 99213 OFFICE O/P EST LOW 20 MIN: CPT | Performed by: FAMILY MEDICINE

## 2025-02-25 ASSESSMENT — PATIENT HEALTH QUESTIONNAIRE - PHQ9
SUM OF ALL RESPONSES TO PHQ QUESTIONS 1-9: 0
SUM OF ALL RESPONSES TO PHQ QUESTIONS 1-9: 0
SUM OF ALL RESPONSES TO PHQ9 QUESTIONS 1 & 2: 0
1. LITTLE INTEREST OR PLEASURE IN DOING THINGS: NOT AT ALL
SUM OF ALL RESPONSES TO PHQ QUESTIONS 1-9: 0
2. FEELING DOWN, DEPRESSED OR HOPELESS: NOT AT ALL
SUM OF ALL RESPONSES TO PHQ QUESTIONS 1-9: 0

## 2025-02-25 NOTE — PROGRESS NOTES
Chief Complaint   Patient presents with    Cough    Pharyngitis     Pt reports that symptom started 2 days ago, AHCT negative  Cough is slightly productive in the morning  No fever    Subjective: (As above and below)     Chief Complaint   Patient presents with    Cough    Pharyngitis     he is a 49 y.o. year old male who presents for evaluation.    Reviewed PmHx, RxHx, FmHx, SocHx, AllgHx and updated in chart.    Review of Systems - negative except as listed above    Objective:     Vitals:    02/25/25 1050   BP: 122/85   Site: Left Upper Arm   Position: Sitting   Cuff Size: Large Adult   Pulse: 85   Resp: 16   Temp: 97.2 °F (36.2 °C)   TempSrc: Oral   SpO2: 95%   Weight: (!) 144.9 kg (319 lb 6.4 oz)   Height: 1.803 m (5' 11\")     Physical Examination: General appearance - alert, well appearing, and in no distress  Mental status - normal mood, behavior, speech, dress, motor activity, and thought processes  Slight nasal congestion  Mouth- nonerythematous  Chest - clear to auscultation, no wheezes, rales or rhonchi, symmetric air entry  Heart - normal rate, regular rhythm, normal S1, S2, no murmurs, rubs, clicks or gallops  Musculoskeletal - no joint tenderness, deformity or swelling  Extremities - peripheral pulses normal, no pedal edema, no clubbing or cyanosis    Assessment/ Plan:   1. Nasal congestion  -encouraged to continue supportive measures, no abx recommended at this time    2. Abrasion of right knee, initial encounter  -healing well    3. Viral URI  -continue as above, call if not improving     Asked pt to schedule follow up for routine labs and physical        Return if symptoms worsen or fail to improve.    I have discussed the diagnosis with the patient and the intended plan as seen in the above orders.  The patient has received an after-visit summary and questions were answered concerning future plans.     Medication Side Effects and Warnings were discussed with patient: yes  Patient Labs were reviewed:

## 2025-02-25 NOTE — PROGRESS NOTES
Chief Complaint   Patient presents with    Cough    Pharyngitis     Pt tested negative for covid on Sunday     Health Maintenance Due   Topic Date Due    Hepatitis B vaccine (1 of 3 - 19+ 3-dose series) Never done    Shingles vaccine (1 of 2) Never done    Pneumococcal 50+ years Vaccine (1 of 1 - PCV) Never done    Flu vaccine (1) 08/01/2024    COVID-19 Vaccine (3 - 2024-25 season) 09/01/2024    A1C test (Diabetic or Prediabetic)  12/11/2024    Lipids  12/11/2024         \"Have you been to the ER, urgent care clinic since your last visit?  Hospitalized since your last visit?\"    NO    “Have you seen or consulted any other health care providers outside of [facility] since your last visit?”    NO

## 2025-03-14 RX ORDER — INDOMETHACIN 50 MG/1
50 CAPSULE ORAL 2 TIMES DAILY WITH MEALS
Qty: 60 CAPSULE | Refills: 3 | Status: SHIPPED | OUTPATIENT
Start: 2025-03-14 | End: 2025-07-12

## 2025-04-27 ASSESSMENT — SLEEP AND FATIGUE QUESTIONNAIRES
HOW LIKELY ARE YOU TO NOD OFF OR FALL ASLEEP WHEN YOU ARE A PASSENGER IN A CAR FOR AN HOUR WITHOUT A BREAK: SLIGHT CHANCE OF DOZING
SELECT ANY OF THE FOLLOWING BEHAVIORS OBSERVED WHILE YOU ARE ASLEEP: LOUD SNORING
DO YOU GENERALLY HAVE DIFFICULTY REMEMBERING THINGS BECAUSE YOU ARE SLEEPY OR TIRED: NO
HOW LIKELY ARE YOU TO NOD OFF OR FALL ASLEEP WHILE LYING DOWN TO REST IN THE AFTERNOON WHEN CIRCUMSTANCES PERMIT: SLIGHT CHANCE OF DOZING
AVERAGE NUMBER OF SLEEP HOURS: 6
DO YOU HAVE DIFFICULTY BEING AS ACTIVE AS YOU WANT TO BE IN THE MORNING BECAUSE YOU ARE SLEEPY OR TIRED: NO
HOW LIKELY ARE YOU TO NOD OFF OR FALL ASLEEP WHILE SITTING AND TALKING TO SOMEONE: WOULD NEVER DOZE
FOSQ SCORE: 18
HOW LIKELY ARE YOU TO NOD OFF OR FALL ASLEEP IN A CAR, WHILE STOPPED FOR A FEW MINUTES IN TRAFFIC: WOULD NEVER DOZE
DO YOU GET TOO LITTLE SLEEP AT NIGHT: NO
HOW LIKELY ARE YOU TO NOD OFF OR FALL ASLEEP IN A CAR, WHILE STOPPED FOR A FEW MINUTES IN TRAFFIC: WOULD NEVER DOZE
SELECT ANY OF THE FOLLOWING BEHAVIORS OBSERVED WHILE PATIENT ASLEEP: LOUD SNORING;LIGHT SNORING
DO YOU GET TOO LITTLE SLEEP AT NIGHT: NO
DO YOU HAVE DIFFICULTY VISITING YOUR FAMILY OR FRIENDS IN THEIR HOME BECAUSE YOU BECOME SLEEPY OR TIRED: NO
HOW LIKELY ARE YOU TO NOD OFF OR FALL ASLEEP WHEN YOU ARE A PASSENGER IN A CAR FOR AN HOUR WITHOUT A BREAK: SLIGHT CHANCE OF DOZING
HOW LIKELY ARE YOU TO NOD OFF OR FALL ASLEEP WHILE WATCHING TV: SLIGHT CHANCE OF DOZING
HOW LIKELY ARE YOU TO NOD OFF OR FALL ASLEEP WHILE WATCHING TV: SLIGHT CHANCE OF DOZING
HOW LIKELY ARE YOU TO NOD OFF OR FALL ASLEEP WHILE SITTING QUIETLY AFTER LUNCH WITHOUT ALCOHOL: WOULD NEVER DOZE
DO YOU HAVE DIFFICULTY BEING AS ACTIVE AS YOU WANT TO BE IN THE EVENING BECAUSE YOU ARE SLEEPY OR TIRED: NO
HAS YOUR RELATIONSHIP WITH FAMILY, FRIENDS OR WORK COLLEAGUES BEEN AFFECTED BECAUSE YOU ARE SLEEPY OR TIRED: NO
DO YOU HAVE DIFFICULTY WATCHING A MOVIE OR VIDEO BECAUSE YOU BECOME SLEEPY OR TIRED: YES, A LITTLE
SELECT ANY OF THE FOLLOWING BEHAVIORS OBSERVED WHILE YOU ARE ASLEEP: LIGHT SNORING
DO YOU TAKE NAPS: NO
HAS YOUR MOOD BEEN AFFECTED BECAUSE YOU ARE SLEEPY OR TIRED: NO
DO YOU HAVE PROBLEMS WITH FREQUENT AWAKENINGS AT NIGHT: NO
DO YOU HAVE DIFFICULTY CONCENTRATING ON THE THINGS YOU DO BECAUSE YOU ARE SLEEPY OR TIRED: YES, A LITTLE
DO YOU HAVE DIFFICULTY OPERATING A MOTOR VEHICLE FOR LONG DISTANCES (GREATER THAN 100 MILES) BECAUSE YOU BECOME SLEEPY: YES, A LITTLE
AVERAGE NUMBER OF SLEEP HOURS: 6
ARE YOU BOTHERED BY WAKING UP TOO EARLY AND NOT BEING ABLE TO GET BACK TO SLEEP: NO
HOW LIKELY ARE YOU TO NOD OFF OR FALL ASLEEP WHILE SITTING AND TALKING TO SOMEONE: WOULD NEVER DOZE
ESS TOTAL SCORE: 5
DO YOU HAVE DIFFICULTY OPERATING A MOTOR VEHICLE FOR SHORT DISTANCES (LESS THAN 100 MILES) BECAUSE YOU BECOME SLEEPY: YES, A LITTLE
ARE YOU BOTHERED BY WAKING UP TOO EARLY AND NOT BEING ABLE TO GET BACK TO SLEEP: NO
HOW LIKELY ARE YOU TO NOD OFF OR FALL ASLEEP WHILE SITTING AND READING: SLIGHT CHANCE OF DOZING
HOW LIKELY ARE YOU TO NOD OFF OR FALL ASLEEP WHILE SITTING INACTIVE IN A PUBLIC PLACE: SLIGHT CHANCE OF DOZING
HOW LIKELY ARE YOU TO NOD OFF OR FALL ASLEEP WHILE SITTING AND READING: SLIGHT CHANCE OF DOZING
HOW LIKELY ARE YOU TO NOD OFF OR FALL ASLEEP WHILE SITTING INACTIVE IN A PUBLIC PLACE: SLIGHT CHANCE OF DOZING
NUMBER OF TIMES YOU WAKE PER NIGHT: 1
HOW LIKELY ARE YOU TO NOD OFF OR FALL ASLEEP WHILE LYING DOWN TO REST IN THE AFTERNOON WHEN CIRCUMSTANCES PERMIT: SLIGHT CHANCE OF DOZING
WHAT TIME DO YOU USUALLY WAKE UP: 17:30
HOW LIKELY ARE YOU TO NOD OFF OR FALL ASLEEP WHILE SITTING QUIETLY AFTER LUNCH WITHOUT ALCOHOL: WOULD NEVER DOZE
DO YOU WORK SHIFTS: NO

## 2025-04-28 ENCOUNTER — OFFICE VISIT (OUTPATIENT)
Age: 50
End: 2025-04-28
Payer: COMMERCIAL

## 2025-04-28 VITALS
WEIGHT: 315 LBS | SYSTOLIC BLOOD PRESSURE: 121 MMHG | OXYGEN SATURATION: 94 % | DIASTOLIC BLOOD PRESSURE: 84 MMHG | HEIGHT: 71 IN | BODY MASS INDEX: 44.1 KG/M2 | TEMPERATURE: 98 F | HEART RATE: 101 BPM

## 2025-04-28 DIAGNOSIS — G47.33 OBSTRUCTIVE SLEEP APNEA (ADULT) (PEDIATRIC): Primary | ICD-10-CM

## 2025-04-28 PROCEDURE — 99204 OFFICE O/P NEW MOD 45 MIN: CPT | Performed by: INTERNAL MEDICINE

## 2025-04-28 NOTE — PROGRESS NOTES
MEALS)., Disp: 60 capsule, Rfl: 3    montelukast (SINGULAIR) 10 MG tablet, Take 1 tablet by mouth nightly (Patient taking differently: Take 1 tablet by mouth once a week), Disp: 90 tablet, Rfl: 0    Interferon Beta-1a (AVONEX PEN) 30 MCG/0.5ML AJKT, INJECT ONE PEN INTRAMUSCULARLY ONCE WEEKLY. REFRIGERATE. PROTECT FROM LIGHT. ALLOW TO WARM TO ROOM TEMPERATURE PRIOR TO USE., Disp: 12 each, Rfl: 4    atorvastatin (LIPITOR) 40 MG tablet, TAKE 1 TABLET BY MOUTH EVERY DAY, Disp: 90 tablet, Rfl: 3    fluticasone (FLONASE) 50 MCG/ACT nasal spray, 1 spray by Each Nostril route daily, Disp: 32 g, Rfl: 1    Cholecalciferol (VITAMIN D3) 50 MCG (2000 UT) CAPS, Take by mouth, Disp: , Rfl:     b complex vitamins capsule, Take 1 capsule by mouth daily, Disp: , Rfl:      He  has a past medical history of Clinically isolated syndrome (HCC), GERD (gastroesophageal reflux disease), Multiple sclerosis (HCC), Muscle weakness, and Vertigo.    He  has a past surgical history that includes Cholecystectomy and hernia repair (Right).    He family history includes Cancer in his mother; Diabetes in his father and mother; Hypertension in his father.    He  reports that he has never smoked. He has never been exposed to tobacco smoke. He has never used smokeless tobacco. He reports current alcohol use. He reports that he does not use drugs.     Review of Systems:  Constitutional:+weight gain.  Eyes:  No blurred vision.  CVS:  No significant chest pain  Pulm:  No significant shortness of breath  GI:  No significant nausea or vomiting,GERD controlled  :  No significant nocturia  Musculoskeletal:  No significant joint pain at night  Skin:  No significant rashes  Neuro:  No significant dizziness   Psych:  No active mood issues    Sleep Review of Systems: notable for no difficulty falling asleep; infrequent awakenings at night;  some  dreaming noted; no nightmares ; no early morning headaches; no memory problems; no concentration issues;

## 2025-05-05 DIAGNOSIS — E78.00 PURE HYPERCHOLESTEROLEMIA, UNSPECIFIED: ICD-10-CM

## 2025-05-05 RX ORDER — ATORVASTATIN CALCIUM 40 MG/1
40 TABLET, FILM COATED ORAL DAILY
Qty: 90 TABLET | Refills: 3 | Status: SHIPPED | OUTPATIENT
Start: 2025-05-05

## 2025-05-29 ENCOUNTER — HOSPITAL ENCOUNTER (OUTPATIENT)
Facility: HOSPITAL | Age: 50
Discharge: HOME | End: 2025-05-29
Payer: COMMERCIAL

## 2025-05-29 VITALS
OXYGEN SATURATION: 98 % | SYSTOLIC BLOOD PRESSURE: 125 MMHG | DIASTOLIC BLOOD PRESSURE: 78 MMHG | HEIGHT: 71 IN | WEIGHT: 315 LBS | BODY MASS INDEX: 44.1 KG/M2 | HEART RATE: 101 BPM | TEMPERATURE: 98.3 F

## 2025-05-29 DIAGNOSIS — G47.33 OBSTRUCTIVE SLEEP APNEA (ADULT) (PEDIATRIC): ICD-10-CM

## 2025-05-29 PROCEDURE — 95811 POLYSOM 6/>YRS CPAP 4/> PARM: CPT | Performed by: INTERNAL MEDICINE

## 2025-06-13 ENCOUNTER — CLINICAL DOCUMENTATION (OUTPATIENT)
Age: 50
End: 2025-06-13

## 2025-06-13 DIAGNOSIS — G47.33 OBSTRUCTIVE SLEEP APNEA (ADULT) (PEDIATRIC): Primary | ICD-10-CM

## 2025-06-13 NOTE — PROGRESS NOTES
Results of sleep study in [facility] [employer] [facility]  Lead tech to convey results to patient         During first part of study, he fell asleep in under 2 minutes. All stages of sleep observed. severe snoring essentially resolved on PAP. Sleep study started as a diagnostic polysomnogram during which an AHI of 115/hour was found. Lowest oxygen saturation was 66%.    PAP therapy applied to control apnea/respiratory events. . At a setting of CPAP 14 cmH20, respiratory events controlled. AHI at optimal pressure was 0/hr. he appeared to tolerate PAP well. Oxygen levels maintained at or above 92% at final pressure      I will order a PAP device for his home use. It will start a little lower than best pressure attained in sleep center (for comfort) and will adjust up during sleep.     Patient should call the office the day he gets set up with new PAP device so we can schedule him for an adherence/compliance visit within 31-90 days of obtaining a new device.      he will need a first adherence visit.     Order attached  Staff to kindly send to dme and inform patient of necessary details pertaining to PAP order and follow-up.

## 2025-06-16 ENCOUNTER — TELEPHONE (OUTPATIENT)
Age: 50
End: 2025-06-16

## 2025-06-19 ENCOUNTER — TELEPHONE (OUTPATIENT)
Age: 50
End: 2025-06-19

## 2025-06-24 ENCOUNTER — TELEPHONE (OUTPATIENT)
Age: 50
End: 2025-06-24

## 2025-06-24 NOTE — TELEPHONE ENCOUNTER
Add[facility] calling to say they faxed over paperwork requesting information from our office so we can do an authorization on the Avonex pens.     She is going to refax it again to us. If you need to call her, she can be reached at . Thank you.

## 2025-06-24 NOTE — TELEPHONE ENCOUNTER
Avonex letter received from [employer] - scanned in Media.     Please print for Dr. Busby's review.     They are requesting updated chart notes with pt's response to treatment and/or recent labs.     Any questions, call their prior authorization coordinatorAddis at [phone], ext [phone]    C: to [facility]

## 2025-07-08 ENCOUNTER — CLINICAL DOCUMENTATION (OUTPATIENT)
Age: 50
End: 2025-07-08

## 2025-07-08 NOTE — PROGRESS NOTES
Informed by staff that patient needs secondary diagnosis.   He said he feels tired in morning.  EPWORTH 7 (repeated)    Split test shows severe apnea with /hour  Request manager to see if different dme would be an option or options available to patient

## 2025-07-14 NOTE — PROGRESS NOTES
Discussed case with Shadia Narayanan at [employer].  Upon further review, [facility] has determined that they have all the clinical information to support the PAP order.  Their office is processing this order as STAT and will contact patient to schedule setup.

## 2025-08-01 ENCOUNTER — TELEPHONE (OUTPATIENT)
Age: 50
End: 2025-08-01

## 2025-08-01 NOTE — TELEPHONE ENCOUNTER
Avonex Pen 30 mcg/0.5 ml     [facility] sent a PA renewal   Key [id number]    Forward to [facility] /  for processing.

## 2025-08-05 ENCOUNTER — TELEPHONE (OUTPATIENT)
Age: 50
End: 2025-08-05

## 2025-08-05 DIAGNOSIS — G35 MULTIPLE SCLEROSIS (HCC): ICD-10-CM

## 2025-08-05 RX ORDER — INTERFERON BETA-1A 30MCG/.5ML
KIT INTRAMUSCULAR
Qty: 4 EACH | Refills: 11 | Status: ACTIVE | OUTPATIENT
Start: 2025-08-05

## 2025-09-07 RX ORDER — INDOMETHACIN 50 MG/1
50 CAPSULE ORAL 2 TIMES DAILY WITH MEALS
Qty: 60 CAPSULE | Refills: 3 | Status: SHIPPED | OUTPATIENT
Start: 2025-09-07 | End: 2026-01-05